# Patient Record
Sex: FEMALE | Race: WHITE | NOT HISPANIC OR LATINO | Employment: FULL TIME | URBAN - METROPOLITAN AREA
[De-identification: names, ages, dates, MRNs, and addresses within clinical notes are randomized per-mention and may not be internally consistent; named-entity substitution may affect disease eponyms.]

---

## 2017-08-14 ENCOUNTER — APPOINTMENT (OUTPATIENT)
Dept: URGENT CARE | Age: 47
End: 2017-08-14
Payer: OTHER MISCELLANEOUS

## 2017-08-14 PROCEDURE — G0382 LEV 3 HOSP TYPE B ED VISIT: HCPCS | Performed by: PREVENTIVE MEDICINE

## 2017-08-14 PROCEDURE — 99283 EMERGENCY DEPT VISIT LOW MDM: CPT | Performed by: PREVENTIVE MEDICINE

## 2017-08-16 ENCOUNTER — APPOINTMENT (OUTPATIENT)
Dept: URGENT CARE | Age: 47
End: 2017-08-16
Payer: OTHER MISCELLANEOUS

## 2017-08-16 PROCEDURE — 99214 OFFICE O/P EST MOD 30 MIN: CPT | Performed by: PREVENTIVE MEDICINE

## 2017-08-28 ENCOUNTER — APPOINTMENT (OUTPATIENT)
Dept: URGENT CARE | Age: 47
End: 2017-08-28
Payer: OTHER MISCELLANEOUS

## 2017-08-28 PROCEDURE — 99213 OFFICE O/P EST LOW 20 MIN: CPT | Performed by: PREVENTIVE MEDICINE

## 2017-11-07 ENCOUNTER — ALLSCRIPTS OFFICE VISIT (OUTPATIENT)
Dept: OTHER | Facility: OTHER | Age: 47
End: 2017-11-07

## 2017-11-07 DIAGNOSIS — N92.6 IRREGULAR MENSTRUATION: ICD-10-CM

## 2017-11-12 NOTE — PROGRESS NOTES
Assessment  1  Irregular menstrual cycle (626 4) (N92 6)   2  Depression (311) (F32 9)    Plan   Irregular menstrual cycle    · (1) CBC/PLT/DIFF; Status:Active; Requested [id number]:[id number];    Perform:[facility]; JGM:[id number]; Ordered; For:Irregular menstrual cycle; Ordered By:Crow, Randy Post;   · (1) [facility]; Status:Active; Requested JNK:[id number];    Perform:[facility]; NADER:[id number]; Ordered; For:Irregular menstrual cycle; Ordered By:Damon Maria;   · (1) HCG QUANT; Status:Active; Requested BBN:[id number];    Perform:[facility]; DXC:[id number]; Ordered; For:Irregular menstrual cycle; Ordered By:Odilon Mariai;   · (1) LH (LEUTINIZING HORMONE); Status:Active; Requested ITD:[id number];    Perform:[facility]; ZQW:[id number]; Ordered; For:Irregular menstrual cycle; Ordered By:Crow, Randy Post;   · (1) PROLACTIN; Status:Active; Requested [id number]:[id number];    Perform:[facility]; FYB:[id number]; Ordered; For:Irregular menstrual cycle; Ordered By:Odilon Mariai;   · (1) T4, FREE; Status:Active; Requested VIE:[id number];    Perform:[facility]; FXT:[id number]; Ordered; For:Irregular menstrual cycle; Ordered By:Odilon Mariai;   · (1) TSH; Status:Active; Requested [id number];    Perform:[facility]; HXJ:[id number]; Ordered;menstrual cycle; Ordered By:Crow, Randy Post;   · Follow-up PRN Evaluation and Treatment  Follow-up  Status: Complete  Done:2017   Ordered;Irregular menstrual cycle; Ordered By: Rohini Cervantes Performed:  Due: 26QFL8794  * US PELVIS COMPLETE (TRANSABDOMINAL AND TRANSVAGINAL); Status:Hold For - Scheduling; Requested WIF:[id number];  Perform:[facility]; ULD:[id number]; Ordered;    For:Irregular menstrual cycle; Ordered By:Damon Maria;    Discussion/Summary  Discussion Summary:   R/marilyn with patient suspect irregular menstrual cycle due to life stressors past couple months; R/marilyn perimenopause may be a factor as well - r/marilyn s/sx and what to expect; nevertheless, plan work-up to evaluate BW and pelvic u/s at this time;depression major issue and patient should not be embarrassed to seek medical help - highly encourage at least follow-up for discussion with a counselor and list given; Strongly encourage pt to consider follow-up with PCP or psychiatrist as well to review various different options for treatment; May find feels much better under treatment and may help combat other symptoms she reported as well; R/marilyn and encourage patient to keep alcohol at less than 2 drinks/day for women - needs to follow-up with PCP a well; Pt expressed understandingfor APE ASAP or sooner if needed  Counseling Documentation With Imm: The patient was counseled regarding diagnostic results,-- instructions for management,-- prognosis,-- risks and benefits of treatment options  Goals and Barriers: The patient has the current Goals: Evaluate missed periods  The patent has the current Barriers: None  Patient's Capacity to Self-Care: Patient is able to Self-Care  Medication SE Review and Pt Understands Tx: The treatment plan was reviewed with the patient/guardian  The patient/guardian understands and agrees with the treatment plan   Self Referrals:   Self Referrals: Yes      Chief Complaint  Chief Complaint Free Text Note Form: 41 yr old NP problem no periods      History of Present Illness  HPI: Pt presents to the office today complaining of irregular menstrual cycle; Last documented real LMP 8/12/17 - periods used to be reg q month, last 2-3 days; No germania/metrohagia/dysmenorrhea; Pt father had first HA in Sept and then passed about a month later - has been under a lot of stress/depression as a results these past couple months; Pt did experience pelvic cramping throughout these past couple months with no bleeding; Pt started spotting last Friday and started with extremely heavy flow and cramping Saturday evening - flow has been waxing and waning since Sunday;  Had another episode of intense cramping yesterday evening for which she took ibuprofen and heating pad with positive effect; Denies hot flashes/night sweats; Does report low libido/sexual sensation over the past couple years; Pt in a mutually monog sexual relationship x 20+ years- declines std/hiv/hep testing; no form of BC that they use; Pt has a past history of severe depression - was hospitalized as a teenager; Has not seen a provider in years to address depression because embarrassed to report to provider- has been an issue over these past couple years and also increased symptoms these past couple months with her father's condition; Pt has been resorting to alcohol - reports 10 drinks/week but thinks may have under-reported; Did lose about 10 lbs when dealing with her father in Sept/Oct  but has gained all the weight back this past week prior to period; Review of Systems   Female ROS: dysmenorrhea-- and-- periods are irregular, but-- no pelvic pain,-- no pelvic pressure,-- no vaginal pain,-- no vaginal discharge,-- no vaginal itching,-- no vaginal lump or mass,-- no vaginal odor,-- no nonmenstrual bleeding,-- no vulvar pain,-- no vulvar itching,-- no vulvar lump or mass,-- no labial swelling,-- denied amenorrhea,-- no menorrhagia,-- no hypomenorrhea,-- no oligomenorrhea,-- no decreased time between periods-- and-- regular length of periods  Focused-Female:  Constitutional: No fever, no chills, feels well, no tiredness, no recent weight gain or loss  Gastrointestinal: no complaints of abdominal pain, no constipation, no nausea or diarrhea, no vomiting, no bloody stools  Genitourinary: dysmenorrhea, but-- no dysuria,-- no pelvic pain-- and-- no incontinence  Neurological: headache  Past Medical History  1  History of pregnancy (V13 29)    Surgical History  1  History of Oral Surgery Tooth Extraction [place] [facility]    Family History  Father    1  Family history of diabetes mellitus (V18 0) (Z83 3)   2   Family history of hypertension (V17 49) (Z82 49)  Brother    3  Family history of diabetes mellitus (V18 0) (Z83 3)   4  Family history of hypertension (V17 49) (Z82 49)    Social History   · Former smoker (L05 41) (Q83 605)   · Denied: History of H/O domestic violence   · Denied: History of drug use   · Occasional alcohol use    Current Meds   1  No Reported Medications Recorded    Allergies  1  No Known Drug Allergies    Vitals  Vital Signs    Recorded: 83OSI5060 81:21FP   Systolic 889, LUE, Sitting   Diastolic 72, LUE, Sitting   Height 5 ft 3 in   Weight 130 lb    BMI Calculated 23 03   BSA Calculated 1 61       Physical Exam  vitals r/marilyn  Constitutional  General appearance: No acute distress, well appearing and well nourished  Genitourinary  External genitalia: Normal and no lesions appreciated  Vagina: Normal, no lesions or dryness appreciated  -- small amount of dark red blood in vaginal canal from cervical os  Urethra: Normal    Urethral meatus: Normal    Bladder: Normal, soft, non-tender and no prolapse or masses appreciated  Cervix: Normal, no palpable masses  Uterus: Normal, non-tender, not enlarged, and no palpable masses  Adnexa/parametria: Normal, non-tender and no fullness or masses appreciated  Abdomen  Abdomen: Normal, non-tender, and no organomegaly noted  Liver and spleen: No hepatomegaly or splenomegaly  Psychiatric  Mood and affect: Abnormal  -- Flat affect  Attending Note  Collaborating Physician Note: Collaborating Physician: I discussed the case with the Advanced Practitioner and reviewed the note,-- I supervised the Advanced Practitioner-- and-- I agree with the Advanced Practitioner note  Future Appointments    Date/Time Provider Specialty Site   12/29/2017 03:30 PM Yanci Greer [facility] Obstetrics/Gynecology [address] OBGYN       Signatures   Electronically signed by :  Reuben Huerta [facility]; Nov 7 2017  3:07PM EST                       (Author)    Electronically signed by : Marco Segovia STEPH Cerrato ; Nov 11 2017 11:22PM EST                       (Author)

## 2017-11-16 ENCOUNTER — LAB CONVERSION - ENCOUNTER (OUTPATIENT)
Dept: OTHER | Facility: OTHER | Age: 47
End: 2017-11-16

## 2017-11-16 LAB
BASOPHILS # BLD AUTO: 1.6 %
BASOPHILS # BLD AUTO: 102 CELLS/UL (ref 0–200)
DEPRECATED RDW RBC AUTO: 13.2 % (ref 11–15)
EOSINOPHIL # BLD AUTO: 230 CELLS/UL (ref 15–500)
EOSINOPHIL # BLD AUTO: 3.6 %
FSH (HISTORICAL): 17.8 MIU/ML
HCG QUANTITATIVE (HISTORICAL): <2 MIU/ML
HCT VFR BLD AUTO: 38.2 % (ref 38.5–45)
HGB BLD-MCNC: 12.2 G/DL (ref 13.2–15.5)
LUTEINIZING HORMONE (HISTORICAL): 13.8 MIU/ML
LYMPHOCYTES # BLD AUTO: 1939 CELLS/UL (ref 850–3900)
LYMPHOCYTES # BLD AUTO: 30.3 %
MCH RBC QN AUTO: 29.3 PG (ref 27–33)
MCHC RBC AUTO-ENTMCNC: 31.9 G/DL (ref 32–36)
MCV RBC AUTO: 91.6 FL (ref 80–100)
MONOCYTES # BLD AUTO: 480 CELLS/UL (ref 200–950)
MONOCYTES (HISTORICAL): 7.5 %
NEUTROPHILS # BLD AUTO: 3648 CELLS/UL (ref 1500–7800)
NEUTROPHILS # BLD AUTO: 57 %
PLATELET # BLD AUTO: 324 THOUSAND/UL (ref 140–400)
PMV BLD AUTO: 11.7 FL (ref 7.5–12.5)
PROLACTIN (HISTORICAL): 8 NG/ML
RBC # BLD AUTO: 4.17 MILLION/UL (ref 4.2–5.1)
T4 FREE SERPL-MCNC: 0.9 NG/DL (ref 0.8–1.8)
TSH SERPL DL<=0.05 MIU/L-ACNC: 1 MIU/L (ref 0.4–4.5)
WBC # BLD AUTO: 6.4 THOUSAND/UL (ref 3.8–10.8)

## 2017-12-13 ENCOUNTER — GENERIC CONVERSION - ENCOUNTER (OUTPATIENT)
Dept: OTHER | Facility: OTHER | Age: 47
End: 2017-12-13

## 2018-01-12 VITALS
BODY MASS INDEX: 23.04 KG/M2 | DIASTOLIC BLOOD PRESSURE: 72 MMHG | WEIGHT: 130 LBS | HEIGHT: 63 IN | SYSTOLIC BLOOD PRESSURE: 108 MMHG

## 2018-03-05 ENCOUNTER — TELEPHONE (OUTPATIENT)
Dept: OBGYN CLINIC | Facility: CLINIC | Age: 48
End: 2018-03-05

## 2018-03-07 PROBLEM — N92.6 IRREGULAR MENSTRUAL CYCLE: Status: ACTIVE | Noted: 2017-11-07

## 2018-03-07 PROBLEM — N64.4 BREAST PAIN: Status: ACTIVE | Noted: 2018-03-07

## 2018-03-07 PROBLEM — F32.A DEPRESSION: Status: ACTIVE | Noted: 2017-11-07

## 2018-03-09 ENCOUNTER — OFFICE VISIT (OUTPATIENT)
Dept: SURGICAL ONCOLOGY | Facility: CLINIC | Age: 48
End: 2018-03-09
Payer: COMMERCIAL

## 2018-03-09 ENCOUNTER — HOSPITAL ENCOUNTER (OUTPATIENT)
Dept: RADIOLOGY | Facility: HOSPITAL | Age: 48
Discharge: HOME | End: 2018-03-09
Attending: RADIOLOGY

## 2018-03-09 VITALS
WEIGHT: 137 LBS | HEIGHT: 63 IN | SYSTOLIC BLOOD PRESSURE: 112 MMHG | TEMPERATURE: 98.8 F | RESPIRATION RATE: 16 BRPM | HEART RATE: 68 BPM | BODY MASS INDEX: 24.27 KG/M2 | DIASTOLIC BLOOD PRESSURE: 70 MMHG

## 2018-03-09 DIAGNOSIS — Z76.89 REFERRAL OF PATIENT WITHOUT EXAMINATION OR TREATMENT: ICD-10-CM

## 2018-03-09 DIAGNOSIS — R92.2 DENSE BREAST TISSUE ON MAMMOGRAM: ICD-10-CM

## 2018-03-09 DIAGNOSIS — N64.4 BREAST PAIN: Primary | ICD-10-CM

## 2018-03-09 DIAGNOSIS — Z12.31 SCREENING MAMMOGRAM, ENCOUNTER FOR: ICD-10-CM

## 2018-03-09 PROBLEM — R92.30 DENSE BREAST TISSUE ON MAMMOGRAM: Status: ACTIVE | Noted: 2018-03-09

## 2018-03-09 PROCEDURE — 99243 OFF/OP CNSLTJ NEW/EST LOW 30: CPT | Performed by: SURGERY

## 2018-03-09 NOTE — LETTER
March 9, 2018     Damaris Cervantes MD  [address]    Patient: Erin Jovel   YOB: 1970   Date of Visit: 3/9/2018       Dear Dr Major Litten: Thank you for referring Erin Jovel to me for evaluation  Below are my notes for this consultation  If you have questions, please do not hesitate to call me  I look forward to following your patient along with you  Sincerely,        Lynne Wu MD        CC: No Recipients  Lynne Wu MD  3/9/2018  9:19 AM  Sign at close encounter               Surgical Oncology Follow Up       [address] [place] [address]  1970  [id number]  [address]  [facility] [place] [address]      Chief Complaint:     Chief Complaint   Patient presents with    Breast Pain       Assessment and Plan:   Assessment/Plan   Intermittent mastodynia, bilateral    Screening 3D diagnostic mammogram, vitamin-E, Premarin as well, three-month follow-up visit    Oncology History:      No history exists  History of Present Illness: This is a 61-year-old woman who was told she had fibrocystic disease starting at the age of 14 when she had relatively significant bilateral breast pain which would cause her to have tears  This became a intermittent problem for her  She restricted her caffeine use and has not been significantly problematic until more recently  The patient had a screening mammogram in 2016 which showed no worrisome findings  She does have dense breast   Patient presents now for an opinion regarding further management  Review of Systems:   Review of Systems   Constitutional: Negative for activity change, appetite change, fatigue and unexpected weight change  HENT: Negative for ear pain, tinnitus, trouble swallowing and voice change      Eyes: Negative for pain and visual disturbance  Respiratory: Negative for cough, shortness of breath, wheezing and stridor  Cardiovascular: Negative for chest pain, palpitations and leg swelling  Gastrointestinal: Negative for abdominal distention, abdominal pain and blood in stool  Endocrine: Negative for cold intolerance and heat intolerance  Genitourinary: Negative for difficulty urinating, dysuria, flank pain and hematuria  Musculoskeletal: Negative for arthralgias, back pain, gait problem and joint swelling  Skin: Negative for color change, rash and wound  Allergic/Immunologic: Negative for immunocompromised state  Neurological: Negative for dizziness, seizures, speech difficulty, weakness and headaches  Hematological: Negative for adenopathy  Psychiatric/Behavioral: Negative for confusion  Past Medical History:      Patient Active Problem List   Diagnosis    Depression    Irregular menstrual cycle    Breast pain    Dense breast tissue on mammogram      History reviewed  No pertinent past medical history  History reviewed  No pertinent surgical history  Family History   Problem Relation Age of Onset    Lung cancer Maternal Uncle     Uterine cancer Maternal Grandmother     Lymphoma Paternal Grandmother         Social History     Social History    Marital status: Single     Spouse name: N/A    Number of children: N/A    Years of education: N/A     Occupational History    Not on file  Social History Main Topics    Smoking status: Former Smoker    Smokeless tobacco: Never Used    Alcohol use Yes      Comment: 4 glasses a week    Drug use: No    Sexual activity: Not on file     Other Topics Concern    Not on file     Social History Narrative    No narrative on file      No current outpatient prescriptions on file     Allergies not on file    Physical Exam:     Vitals:    03/09/18 0832   BP: 112/70   Pulse: 68   Resp: 16   Temp: 98 8 °F (37 1 °C)     Physical Exam Pulmonary/Chest:     Both breasts were examined in the sitting and supine position  There are no worrisome skin lesions, no nipple retraction and no nipple discharge  There are no dominant masses, axillary adenopathy or supraclavicular adenopathy on either side  Results:   Pathology:  Not applicable  Imaging  I reviewed her imaging from 2016 I concur with report  Discussion/Summary:   The patient does not have significant family history for breast cancer  She has no sign or symptom of breast cancer  She has a normal clinical breast exam   I recommended she have a screening 3D mammogram because of her dense breast   I reviewed vitamin-E and Premarin as well with her provided her written instructions  I explained that approximately 25% of patients will be benefit from this medication  We will see her back in 3 months unless she has abnormal imaging  All questions were answered to the patient's satisfaction  Advance Care Planning/Advance Directives:  I discussed the disease status, treatment plans and follow-up with the patient

## 2018-03-09 NOTE — PROGRESS NOTES
Surgical Oncology Follow Up       [address] [place] [address],[address] [facility]  [facility] [place] [address] [address] [facility]  1970  [id number]  [address] [facility] [facility]  [facility] [place] [address] [address] [id number]      Chief Complaint:     Chief Complaint   Patient presents with    Breast Pain       Assessment and Plan:   Assessment/Plan   Intermittent mastodynia, bilateral    Screening 3D diagnostic mammogram, vitamin-E, Premarin as well, three-month follow-up visit    Oncology History:      No history exists  History of Present Illness: This is a 70-year-old woman who was told she had fibrocystic disease starting at the age of 14 when she had relatively significant bilateral breast pain which would cause her to have tears  This became a intermittent problem for her  She restricted her caffeine use and has not been significantly problematic until more recently  The patient had a screening mammogram in 2016 which showed no worrisome findings  She does have dense breast   Patient presents now for an opinion regarding further management  Review of Systems:   Review of Systems   Constitutional: Negative for activity change, appetite change, fatigue and unexpected weight change  HENT: Negative for ear pain, tinnitus, trouble swallowing and voice change  Eyes: Negative for pain and visual disturbance  Respiratory: Negative for cough, shortness of breath, wheezing and stridor  Cardiovascular: Negative for chest pain, palpitations and leg swelling  Gastrointestinal: Negative for abdominal distention, abdominal pain and blood in stool  Endocrine: Negative for cold intolerance and heat intolerance  Genitourinary: Negative for difficulty urinating, dysuria, flank pain and hematuria  Musculoskeletal: Negative for arthralgias, back pain, gait problem and joint swelling     Skin: Negative for color change, rash and wound  Allergic/Immunologic: Negative for immunocompromised state  Neurological: Negative for dizziness, seizures, speech difficulty, weakness and headaches  Hematological: Negative for adenopathy  Psychiatric/Behavioral: Negative for confusion  Past Medical History:      Patient Active Problem List   Diagnosis    Depression    Irregular menstrual cycle    Breast pain    Dense breast tissue on mammogram      History reviewed  No pertinent past medical history  History reviewed  No pertinent surgical history  Family History   Problem Relation Age of Onset    Lung cancer Maternal Uncle     Uterine cancer Maternal Grandmother     Lymphoma Paternal Grandmother         Social History     Social History    Marital status: Single     Spouse name: N/A    Number of children: N/A    Years of education: N/A     Occupational History    Not on file  Social History Main Topics    Smoking status: Former Smoker    Smokeless tobacco: Never Used    Alcohol use Yes      Comment: 4 glasses a week    Drug use: No    Sexual activity: Not on file     Other Topics Concern    Not on file     Social History Narrative    No narrative on file      No current outpatient prescriptions on file  Allergies not on file    Physical Exam:     Vitals:    03/09/18 0832   BP: 112/70   Pulse: 68   Resp: 16   Temp: 98 8 °F (37 1 °C)     Physical Exam   Pulmonary/Chest:     Both breasts were examined in the sitting and supine position  There are no worrisome skin lesions, no nipple retraction and no nipple discharge  There are no dominant masses, axillary adenopathy or supraclavicular adenopathy on either side  Results:   Pathology:  Not applicable  Imaging  I reviewed her imaging from 2016 I concur with report  Discussion/Summary:   The patient does not have significant family history for breast cancer  She has no sign or symptom of breast cancer    She has a normal clinical breast exam   I recommended she have a screening 3D mammogram because of her dense breast   I reviewed vitamin-E and Premarin as well with her provided her written instructions  I explained that approximately 25% of patients will be benefit from this medication  We will see her back in 3 months unless she has abnormal imaging  All questions were answered to the patient's satisfaction  Advance Care Planning/Advance Directives:  I discussed the disease status, treatment plans and follow-up with the patient

## 2018-04-06 ENCOUNTER — HOSPITAL ENCOUNTER (OUTPATIENT)
Dept: MAMMOGRAPHY | Facility: CLINIC | Age: 48
Discharge: HOME | End: 2018-04-06
Payer: COMMERCIAL

## 2018-04-06 PROCEDURE — 77066 DX MAMMO INCL CAD BI: CPT

## 2018-04-06 PROCEDURE — G0279 TOMOSYNTHESIS, MAMMO: HCPCS

## 2018-06-11 ENCOUNTER — TELEPHONE (OUTPATIENT)
Dept: SURGICAL ONCOLOGY | Facility: CLINIC | Age: 48
End: 2018-06-11

## 2018-09-17 ENCOUNTER — APPOINTMENT (OUTPATIENT)
Dept: URGENT CARE | Age: 48
End: 2018-09-17
Payer: OTHER MISCELLANEOUS

## 2018-09-17 ENCOUNTER — APPOINTMENT (OUTPATIENT)
Dept: RADIOLOGY | Age: 48
End: 2018-09-17
Payer: OTHER MISCELLANEOUS

## 2018-09-17 DIAGNOSIS — S69.91XA INJURY OF RIGHT WRIST, INITIAL ENCOUNTER: ICD-10-CM

## 2018-09-17 DIAGNOSIS — S69.91XA INJURY OF RIGHT WRIST, INITIAL ENCOUNTER: Primary | ICD-10-CM

## 2018-09-17 PROCEDURE — 73110 X-RAY EXAM OF WRIST: CPT

## 2018-09-17 PROCEDURE — 99284 EMERGENCY DEPT VISIT MOD MDM: CPT | Performed by: PREVENTIVE MEDICINE

## 2018-09-17 PROCEDURE — G0383 LEV 4 HOSP TYPE B ED VISIT: HCPCS | Performed by: PREVENTIVE MEDICINE

## 2018-09-20 ENCOUNTER — APPOINTMENT (OUTPATIENT)
Dept: URGENT CARE | Age: 48
End: 2018-09-20
Payer: OTHER MISCELLANEOUS

## 2018-09-20 PROCEDURE — 99213 OFFICE O/P EST LOW 20 MIN: CPT | Performed by: PREVENTIVE MEDICINE

## 2019-10-31 ENCOUNTER — ANNUAL EXAM (OUTPATIENT)
Dept: OBGYN CLINIC | Facility: CLINIC | Age: 49
End: 2019-10-31
Payer: COMMERCIAL

## 2019-10-31 VITALS
HEIGHT: 62 IN | BODY MASS INDEX: 25.03 KG/M2 | SYSTOLIC BLOOD PRESSURE: 120 MMHG | WEIGHT: 136 LBS | DIASTOLIC BLOOD PRESSURE: 80 MMHG

## 2019-10-31 DIAGNOSIS — Z01.419 ROUTINE GYNECOLOGICAL EXAMINATION: Primary | ICD-10-CM

## 2019-10-31 DIAGNOSIS — N92.1 MENORRHAGIA WITH IRREGULAR CYCLE: ICD-10-CM

## 2019-10-31 DIAGNOSIS — Z12.31 ENCOUNTER FOR SCREENING MAMMOGRAM FOR MALIGNANT NEOPLASM OF BREAST: ICD-10-CM

## 2019-10-31 DIAGNOSIS — Z13.220 SCREENING FOR LIPID DISORDERS: ICD-10-CM

## 2019-10-31 DIAGNOSIS — N92.6 IRREGULAR MENSES: ICD-10-CM

## 2019-10-31 PROCEDURE — 87624 HPV HI-RISK TYP POOLED RSLT: CPT | Performed by: PHYSICIAN ASSISTANT

## 2019-10-31 PROCEDURE — 99396 PREV VISIT EST AGE 40-64: CPT | Performed by: PHYSICIAN ASSISTANT

## 2019-10-31 PROCEDURE — G0145 SCR C/V CYTO,THINLAYER,RESCR: HCPCS | Performed by: PHYSICIAN ASSISTANT

## 2019-10-31 RX ORDER — OMEPRAZOLE 40 MG/1
40 CAPSULE, DELAYED RELEASE ORAL DAILY
Refills: 2 | COMMUNITY
Start: 2019-08-02 | End: 2020-06-11

## 2019-10-31 RX ORDER — METHOCARBAMOL 500 MG/1
TABLET, FILM COATED ORAL
Refills: 0 | COMMUNITY
Start: 2019-10-22 | End: 2020-06-11

## 2019-10-31 RX ORDER — CYCLOBENZAPRINE HCL 10 MG
10 TABLET ORAL 3 TIMES DAILY PRN
Refills: 0 | COMMUNITY
Start: 2019-09-16 | End: 2020-06-11

## 2019-10-31 RX ORDER — ACETAMINOPHEN AND CODEINE PHOSPHATE 120; 12 MG/5ML; MG/5ML
1 SOLUTION ORAL DAILY
Qty: 30 TABLET | Refills: 2 | Status: SHIPPED | OUTPATIENT
Start: 2019-10-31 | End: 2020-02-12

## 2019-10-31 RX ORDER — MELOXICAM 7.5 MG/1
7.5 TABLET ORAL DAILY
Refills: 0 | COMMUNITY
Start: 2019-07-24 | End: 2020-06-11

## 2019-10-31 NOTE — PROGRESS NOTES
Assessment/Plan   Problem List Items Addressed This Visit        Other    Routine gynecological examination - Primary     Pap guidelines reviewed  Pap with HPV done today  Reviewed irregular, heavy, painful menses  Likely starting perimenopause, script for TFTs and CBC given to further evaluate  Reviewed options with patient including Ibuprofen 600mg Q6hrs, OCP, Mirena IUD  Patient would like to consider OCP, would like estrogen free secondary to age  Script for MELECIO Urban given  Reviewed when to start, what to do if misses pill  Recommend using condoms for the 1st month on the pill, if misses more than 2 pills in the pack, if on antibiotics and for STD prevention  Reviewed common side effects of the pill including nausea, vomiting, breast pain, bloating, fatigue, mood swings, weight gain, and increased acne  Reassured side effects typically diminish in the first month or two on the pill  Will return to office in 3 months for pill check  Or call if doing ok  Script for routine blood work given  Relevant Orders    Liquid-based pap, screening (Completed)    HPV High Risk (Completed)      Other Visit Diagnoses     Encounter for screening mammogram for malignant neoplasm of breast        Relevant Orders    Mammo screening bilateral w 3d & cad    Irregular menses        Relevant Medications    norethindrone (MICRONOR) 0 35 MG tablet    Other Relevant Orders    Comprehensive metabolic panel    T4, free    TSH, 3rd generation    Menorrhagia with irregular cycle        Relevant Medications    norethindrone (MICRONOR) 0 35 MG tablet    Other Relevant Orders    CBC and differential    Comprehensive metabolic panel    Screening for lipid disorders        Relevant Orders    Lipid panel          Subjective:     Patient ID: Alvaro Scott is a 54 y o  y o  female  HPI  55 yo seen for annual exam  Menses irregular, every 1-2 months apart  Last about 3 days, heavy and painful  Denies bowel or bladder issues  Has not had routine blood work done in quite a while  Last pap: unknown? Last mammogram: 2018 [facility] 1: Negative  The following portions of the patient's history were reviewed and updated as appropriate:   She  has a past medical history of Arthritis and Depression  She   Patient Active Problem List    Diagnosis Date Noted    Routine gynecological examination 10/31/2019    Dense breast tissue on mammogram 2018    Screening mammogram, encounter for 2018    Breast pain 2018    Depression 2017    Irregular menstrual cycle 2017     She  has a past surgical history that includes [place] tooth extraction  Her family history includes Diabetes in her brother and father; Hypertension in her brother, father, and mother; Lung cancer in her maternal uncle; Lymphoma in her paternal grandmother; Ovarian cancer in her maternal grandmother; Uterine cancer in her maternal grandmother  She  reports that she has quit smoking  She has never used smokeless tobacco  She reports that she drinks alcohol  She reports that she does not use drugs  Current Outpatient Medications   Medication Sig Dispense Refill    cyclobenzaprine (FLEXERIL) 10 mg tablet Take 10 mg by mouth 3 (three) times a day as needed  0    diclofenac sodium (VOLTAREN) 50 mg EC tablet Take 50 mg by mouth 2 (two) times a day as needed  0    meloxicam (MOBIC) 7 5 mg tablet Take 7 5 mg by mouth daily  0    methocarbamol (ROBAXIN) 500 mg tablet TAKE 2 TABLETS BY MOUTH 4 TIMES A DAY AS NEEDED FOR MUSCLE SPASM  0    norethindrone (MICRONOR) 0 35 MG tablet Take 1 tablet (0 35 mg total) by mouth daily 30 tablet 2    omeprazole (PriLOSEC) 40 MG capsule Take 40 mg by mouth daily  2     No current facility-administered medications for this visit  She has No Known Allergies       Menstrual History:  OB History        2    Para   2    Term   2            AB        Living   2       SAB        TAB        Ectopic Multiple        Live Births   2                Menarche age: 7  Patient's last menstrual period was 10/02/2019  Period Cycle (Days): (28-60)  Period Duration (Days): 3  Period Pattern: (!) Irregular  Menstrual Flow: Moderate, Heavy  Dysmenorrhea: (!) Moderate  Dysmenorrhea Symptoms: Cramping      Review of Systems   Constitutional: Negative for fatigue, fever and unexpected weight change  HENT: Negative for dental problem and sinus pressure  Eyes: Negative for visual disturbance  Respiratory: Negative for cough, shortness of breath and wheezing  Cardiovascular: Negative for chest pain  Gastrointestinal: Negative for abdominal pain, blood in stool, constipation, diarrhea, nausea and vomiting  Endocrine: Negative for polydipsia  Genitourinary: Positive for menstrual problem and pelvic pain  Negative for difficulty urinating, dyspareunia, dysuria, frequency, hematuria and urgency  Musculoskeletal: Negative for arthralgias and back pain  Neurological: Negative for dizziness, seizures, light-headedness and headaches  Psychiatric/Behavioral: Negative for suicidal ideas  The patient is not nervous/anxious  Objective:  Vitals:    10/31/19 0925   BP: 120/80   BP Location: Left arm   Patient Position: Sitting   Cuff Size: Standard   Weight: 61 7 kg (136 lb)   Height: 5' 2" (1 575 m)      Physical Exam   Constitutional: She is oriented to person, place, and time  She appears well-developed and well-nourished  Genitourinary: Rectum normal, vagina normal and uterus normal  There is no rash, tenderness, lesion, injury or Bartholin's cyst on the right labia  There is no rash, tenderness, lesion, injury or Bartholin's cyst on the left labia  Vagina exhibits no lesion  No erythema, tenderness or bleeding in the vagina  No signs of injury around the vagina  No vaginal discharge found  Right adnexum does not display mass, does not display tenderness and does not display fullness   Left adnexum does not display mass, does not display tenderness and does not display fullness  Cervix does not exhibit motion tenderness, lesion or discharge  Uterus is not enlarged, tender, exhibiting a mass, irregular (is regular) or mobile  Rectal exam shows no external hemorrhoid, no internal hemorrhoid, no fissure, no mass, no tenderness, anal tone normal and guaiac negative stool  HENT:   Head: Normocephalic and atraumatic  Neck: No thyromegaly present  Cardiovascular: Normal rate, regular rhythm and normal heart sounds  Exam reveals no gallop and no friction rub  No murmur heard  Pulmonary/Chest: Effort normal and breath sounds normal  No respiratory distress  She has no wheezes  Right breast exhibits no inverted nipple, no mass, no nipple discharge, no skin change and no tenderness  Left breast exhibits no inverted nipple, no mass, no nipple discharge, no skin change and no tenderness  No breast swelling, tenderness, discharge or bleeding  Breasts are symmetrical    Abdominal: Soft  She exhibits no distension and no mass  There is no tenderness  There is no rebound and no guarding  No hernia  Lymphadenopathy:     She has no cervical adenopathy  Right: No inguinal adenopathy present  Left: No inguinal adenopathy present  Neurological: She is alert and oriented to person, place, and time  Skin: Skin is warm and dry  Psychiatric: She has a normal mood and affect   Her behavior is normal

## 2019-11-01 LAB
HPV HR 12 DNA CVX QL NAA+PROBE: NEGATIVE
HPV16 DNA CVX QL NAA+PROBE: NEGATIVE
HPV18 DNA CVX QL NAA+PROBE: NEGATIVE

## 2019-11-05 LAB
LAB AP GYN PRIMARY INTERPRETATION: NORMAL
LAB AP LMP: NORMAL
Lab: NORMAL

## 2019-11-19 NOTE — ASSESSMENT & PLAN NOTE
Pap guidelines reviewed  Pap with HPV done today  Reviewed irregular, heavy, painful menses  Likely starting perimenopause, script for TFTs and CBC given to further evaluate  Reviewed options with patient including Ibuprofen 600mg Q6hrs, OCP, Mirena IUD  Patient would like to consider OCP, would like estrogen free secondary to age  Script for MELECIO Urban given  Reviewed when to start, what to do if misses pill  Recommend using condoms for the 1st month on the pill, if misses more than 2 pills in the pack, if on antibiotics and for STD prevention  Reviewed common side effects of the pill including nausea, vomiting, breast pain, bloating, fatigue, mood swings, weight gain, and increased acne  Reassured side effects typically diminish in the first month or two on the pill  Will return to office in 3 months for pill check  Or call if doing ok  Script for routine blood work given

## 2020-02-02 DIAGNOSIS — N92.1 MENORRHAGIA WITH IRREGULAR CYCLE: ICD-10-CM

## 2020-02-02 DIAGNOSIS — N92.6 IRREGULAR MENSES: ICD-10-CM

## 2020-02-12 RX ORDER — ACETAMINOPHEN AND CODEINE PHOSPHATE 120; 12 MG/5ML; MG/5ML
SOLUTION ORAL
Qty: 28 TABLET | Refills: 0 | Status: SHIPPED | OUTPATIENT
Start: 2020-02-12 | End: 2020-04-22

## 2020-04-20 DIAGNOSIS — N92.1 MENORRHAGIA WITH IRREGULAR CYCLE: ICD-10-CM

## 2020-04-20 DIAGNOSIS — N92.6 IRREGULAR MENSES: ICD-10-CM

## 2020-04-22 RX ORDER — ACETAMINOPHEN AND CODEINE PHOSPHATE 120; 12 MG/5ML; MG/5ML
SOLUTION ORAL
Qty: 28 TABLET | Refills: 2 | Status: SHIPPED | OUTPATIENT
Start: 2020-04-22 | End: 2020-06-11

## 2020-05-29 ENCOUNTER — TELEPHONE (OUTPATIENT)
Dept: OBGYN CLINIC | Facility: CLINIC | Age: 50
End: 2020-05-29

## 2020-06-11 ENCOUNTER — OFFICE VISIT (OUTPATIENT)
Dept: OBGYN CLINIC | Facility: CLINIC | Age: 50
End: 2020-06-11
Payer: COMMERCIAL

## 2020-06-11 VITALS
SYSTOLIC BLOOD PRESSURE: 118 MMHG | BODY MASS INDEX: 27.05 KG/M2 | WEIGHT: 147 LBS | HEIGHT: 62 IN | DIASTOLIC BLOOD PRESSURE: 82 MMHG

## 2020-06-11 DIAGNOSIS — R23.2 HOT FLASHES: Primary | ICD-10-CM

## 2020-06-11 DIAGNOSIS — F41.9 ANXIETY: ICD-10-CM

## 2020-06-11 PROCEDURE — 99214 OFFICE O/P EST MOD 30 MIN: CPT | Performed by: PHYSICIAN ASSISTANT

## 2020-06-11 RX ORDER — MELOXICAM 15 MG/1
15 TABLET ORAL DAILY
COMMUNITY
Start: 2020-04-30

## 2020-06-11 RX ORDER — OMEPRAZOLE 20 MG/1
20 CAPSULE, DELAYED RELEASE ORAL
COMMUNITY
Start: 2020-03-27

## 2020-06-11 RX ORDER — PAROXETINE 10 MG/1
10 TABLET, FILM COATED ORAL DAILY
Qty: 30 TABLET | Refills: 1 | Status: SHIPPED | OUTPATIENT
Start: 2020-06-11 | End: 2020-08-28

## 2020-08-27 DIAGNOSIS — F41.9 ANXIETY: ICD-10-CM

## 2020-08-27 DIAGNOSIS — R23.2 HOT FLASHES: ICD-10-CM

## 2020-08-28 RX ORDER — PAROXETINE 10 MG/1
TABLET, FILM COATED ORAL
Qty: 30 TABLET | Refills: 1 | Status: SHIPPED | OUTPATIENT
Start: 2020-08-28

## 2021-04-27 ENCOUNTER — IMMUNIZATIONS (OUTPATIENT)
Dept: FAMILY MEDICINE CLINIC | Facility: HOSPITAL | Age: 51
End: 2021-04-27

## 2021-04-27 DIAGNOSIS — Z23 ENCOUNTER FOR IMMUNIZATION: Primary | ICD-10-CM

## 2021-04-27 PROCEDURE — 0011A SARS-COV-2 / COVID-19 MRNA VACCINE (MODERNA) 100 MCG: CPT

## 2021-04-27 PROCEDURE — 91301 SARS-COV-2 / COVID-19 MRNA VACCINE (MODERNA) 100 MCG: CPT

## 2021-05-25 ENCOUNTER — IMMUNIZATIONS (OUTPATIENT)
Dept: FAMILY MEDICINE CLINIC | Facility: HOSPITAL | Age: 51
End: 2021-05-25

## 2021-05-25 DIAGNOSIS — Z23 ENCOUNTER FOR IMMUNIZATION: Primary | ICD-10-CM

## 2021-05-25 PROCEDURE — 0012A SARS-COV-2 / COVID-19 MRNA VACCINE (MODERNA) 100 MCG: CPT

## 2021-05-25 PROCEDURE — 91301 SARS-COV-2 / COVID-19 MRNA VACCINE (MODERNA) 100 MCG: CPT

## 2021-12-03 ENCOUNTER — OCCMED (OUTPATIENT)
Dept: URGENT CARE | Facility: CLINIC | Age: 51
End: 2021-12-03

## 2021-12-03 ENCOUNTER — APPOINTMENT (OUTPATIENT)
Dept: RADIOLOGY | Facility: CLINIC | Age: 51
End: 2021-12-03
Payer: COMMERCIAL

## 2021-12-03 DIAGNOSIS — S49.92XA INJURY OF LEFT UPPER ARM, INITIAL ENCOUNTER: ICD-10-CM

## 2021-12-03 DIAGNOSIS — S49.92XA INJURY OF LEFT UPPER ARM, INITIAL ENCOUNTER: Primary | ICD-10-CM

## 2021-12-03 PROCEDURE — 73080 X-RAY EXAM OF ELBOW: CPT

## 2021-12-03 PROCEDURE — 73110 X-RAY EXAM OF WRIST: CPT

## 2021-12-05 ENCOUNTER — APPOINTMENT (OUTPATIENT)
Dept: URGENT CARE | Facility: CLINIC | Age: 51
End: 2021-12-05
Payer: OTHER MISCELLANEOUS

## 2021-12-05 PROCEDURE — 99213 OFFICE O/P EST LOW 20 MIN: CPT | Performed by: PHYSICIAN ASSISTANT

## 2021-12-30 ENCOUNTER — HOSPITAL ENCOUNTER (EMERGENCY)
Facility: HOSPITAL | Age: 51
Discharge: HOME | End: 2021-12-30
Payer: COMMERCIAL

## 2021-12-30 ENCOUNTER — APPOINTMENT (EMERGENCY)
Dept: RADIOLOGY | Facility: HOSPITAL | Age: 51
End: 2021-12-30
Payer: COMMERCIAL

## 2021-12-30 VITALS
DIASTOLIC BLOOD PRESSURE: 70 MMHG | TEMPERATURE: 98.5 F | HEART RATE: 60 BPM | RESPIRATION RATE: 17 BRPM | SYSTOLIC BLOOD PRESSURE: 115 MMHG | OXYGEN SATURATION: 100 %

## 2021-12-30 DIAGNOSIS — R06.00 DYSPNEA, UNSPECIFIED TYPE: Primary | ICD-10-CM

## 2021-12-30 LAB
ALBUMIN SERPL BCP-MCNC: 4.5 G/DL (ref 3.4–4.8)
ALP SERPL-CCNC: 45.5 U/L (ref 35–140)
ALT SERPL W P-5'-P-CCNC: 11 U/L (ref 5–54)
ANION GAP SERPL CALCULATED.3IONS-SCNC: 6 MMOL/L (ref 4–13)
AST SERPL W P-5'-P-CCNC: 15 U/L (ref 15–41)
BASOPHILS # BLD MANUAL: 0 THOUSAND/UL (ref 0–0.1)
BASOPHILS NFR MAR MANUAL: 0 % (ref 0–1)
BILIRUB SERPL-MCNC: 0.57 MG/DL (ref 0.3–1.2)
BUN SERPL-MCNC: 17 MG/DL (ref 6–20)
CALCIUM SERPL-MCNC: 9.6 MG/DL (ref 8.4–10.2)
CARDIAC TROPONIN I PNL SERPL HS: 3 NG/L
CHLORIDE SERPL-SCNC: 104 MMOL/L (ref 96–108)
CO2 SERPL-SCNC: 31 MMOL/L (ref 22–33)
CREAT SERPL-MCNC: 0.81 MG/DL (ref 0.4–1.1)
EOSINOPHIL # BLD MANUAL: 0 THOUSAND/UL (ref 0–0.4)
EOSINOPHIL NFR BLD MANUAL: 0 % (ref 0–6)
ERYTHROCYTE [DISTWIDTH] IN BLOOD BY AUTOMATED COUNT: 12.9 % (ref 11.6–15.1)
GFR SERPL CREATININE-BSD FRML MDRD: 85 ML/MIN/1.73SQ M
GLUCOSE SERPL-MCNC: 92 MG/DL (ref 65–140)
HCT VFR BLD AUTO: 37.2 % (ref 34.8–46.1)
HGB BLD-MCNC: 12.3 G/DL (ref 11.5–15.4)
LYMPHOCYTES # BLD AUTO: 3.11 THOUSAND/UL (ref 0.6–4.47)
LYMPHOCYTES # BLD AUTO: 39 % (ref 14–44)
MCH RBC QN AUTO: 29.6 PG (ref 26.8–34.3)
MCHC RBC AUTO-ENTMCNC: 33.1 G/DL (ref 31.4–37.4)
MCV RBC AUTO: 90 FL (ref 82–98)
MONOCYTES # BLD AUTO: 0.48 THOUSAND/UL (ref 0–1.22)
MONOCYTES NFR BLD: 6 % (ref 4–12)
NEUTROPHILS # BLD MANUAL: 4.15 THOUSAND/UL (ref 1.85–7.62)
NEUTS SEG NFR BLD AUTO: 52 % (ref 43–75)
PLATELET # BLD AUTO: 284 THOUSANDS/UL (ref 149–390)
PLATELET BLD QL SMEAR: ADEQUATE
PMV BLD AUTO: 11 FL (ref 8.9–12.7)
POTASSIUM SERPL-SCNC: 4 MMOL/L (ref 3.5–5)
PROT SERPL-MCNC: 7.8 G/DL (ref 6.4–8.3)
RBC # BLD AUTO: 4.15 MILLION/UL (ref 3.81–5.12)
RBC MORPH BLD: NORMAL
SODIUM SERPL-SCNC: 141 MMOL/L (ref 133–145)
VARIANT LYMPHS # BLD AUTO: 3 %
WBC # BLD AUTO: 7.98 THOUSAND/UL (ref 4.31–10.16)

## 2021-12-30 PROCEDURE — 85007 BL SMEAR W/DIFF WBC COUNT: CPT

## 2021-12-30 PROCEDURE — 99284 EMERGENCY DEPT VISIT MOD MDM: CPT

## 2021-12-30 PROCEDURE — 93005 ELECTROCARDIOGRAM TRACING: CPT

## 2021-12-30 PROCEDURE — 36415 COLL VENOUS BLD VENIPUNCTURE: CPT

## 2021-12-30 PROCEDURE — 71045 X-RAY EXAM CHEST 1 VIEW: CPT

## 2021-12-30 PROCEDURE — 99285 EMERGENCY DEPT VISIT HI MDM: CPT

## 2021-12-30 PROCEDURE — 85027 COMPLETE CBC AUTOMATED: CPT

## 2021-12-30 PROCEDURE — 84484 ASSAY OF TROPONIN QUANT: CPT

## 2021-12-30 PROCEDURE — 80053 COMPREHEN METABOLIC PANEL: CPT

## 2021-12-30 NOTE — ED PROVIDER NOTES
History  Chief Complaint   Patient presents with    Shortness of Breath     pt reports SOB for a couple of years  states not worse today, but thought she better get checked  denies any c/p or other complaints  47-year-old female no significant past medical history other than mild anxiety and GERD presents secondary to chest tightness at times associated with shortness of breath  Patient states this been going on for over a month it is intermittent and she is essentially not symptomatic today but she is concerned because she states his strong cardiac history in her family she does not have a doctor and she is concerned she may have a cardiac cause for this  Patient really has no medical history otherwise does not take any meds on a regular basis  She is awake alert oriented normal vital signs on presentation  Prior to Admission Medications   Prescriptions Last Dose Informant Patient Reported? Taking? PARoxetine (PAXIL) 10 mg tablet   No No   Sig: TAKE 1 TABLET BY MOUTH EVERY DAY   meloxicam (MOBIC) 15 mg tablet   Yes No   Sig: Take 15 mg by mouth daily   omeprazole (PriLOSEC) 20 mg delayed release capsule   Yes No   Sig: Take 20 mg by mouth daily before breakfast      Facility-Administered Medications: None       Past Medical History:   Diagnosis Date    Arthritis     Back pain     Depression     GERD (gastroesophageal reflux disease)        Past Surgical History:   Procedure Laterality Date    WISDOM TOOTH EXTRACTION         Family History   Problem Relation Age of Onset    Lung cancer Maternal Uncle     Uterine cancer Maternal Grandmother     Ovarian cancer Maternal Grandmother     Lymphoma Paternal Grandmother     Hypertension Mother     Heart disease Mother         acute    Diabetes Father     Hypertension Father     Heart attack Father     Diabetes Brother     Hypertension Brother      I have reviewed and agree with the history as documented      E-Cigarette/Vaping    E-Cigarette Use Current Every Day User      E-Cigarette/Vaping Substances     Social History     Tobacco Use    Smoking status: Former Smoker     Quit date:      Years since quittin 0    Smokeless tobacco: Never Used   Vaping Use    Vaping Use: Every day   Substance Use Topics    Alcohol use: Yes     Comment: 1 drink/wk- occasionally rarely     Drug use: No       Review of Systems   Constitutional: Negative for chills and fever  HENT: Negative for congestion  Eyes: Negative for visual disturbance  Respiratory: Positive for shortness of breath  Cardiovascular: Negative for chest pain  Gastrointestinal: Negative for abdominal pain  Endocrine: Negative for cold intolerance  Genitourinary: Negative for frequency  Musculoskeletal: Negative for gait problem  Skin: Negative for rash  Neurological: Negative for dizziness  Psychiatric/Behavioral: Negative for behavioral problems and confusion  Physical Exam  Physical Exam  Vitals and nursing note reviewed  Constitutional:       Appearance: She is well-developed  HENT:      Head: Normocephalic and atraumatic  Eyes:      Conjunctiva/sclera: Conjunctivae normal       Pupils: Pupils are equal, round, and reactive to light  Cardiovascular:      Rate and Rhythm: Normal rate and regular rhythm  Heart sounds: Normal heart sounds  Pulmonary:      Effort: Pulmonary effort is normal       Breath sounds: Normal breath sounds  Abdominal:      General: Bowel sounds are normal       Palpations: Abdomen is soft  Musculoskeletal:         General: Normal range of motion  Cervical back: Normal range of motion and neck supple  Skin:     General: Skin is warm and dry  Capillary Refill: Capillary refill takes less than 2 seconds  Neurological:      Mental Status: She is alert and oriented to person, place, and time     Psychiatric:         Behavior: Behavior normal          Vital Signs  ED Triage Vitals [21 1427] Temperature Pulse Respirations Blood Pressure SpO2   98 5 °F (36 9 °C) 63 16 137/68 100 %      Temp Source Heart Rate Source Patient Position - Orthostatic VS BP Location FiO2 (%)   Oral Monitor -- -- --      Pain Score       No Pain           Vitals:    12/30/21 1427   BP: 137/68   Pulse: 63         Visual Acuity      ED Medications  Medications - No data to display    Diagnostic Studies  Results Reviewed     Procedure Component Value Units Date/Time    CBC and differential [[id number]]     Lab Status: No result Specimen: Blood     Comprehensive metabolic panel [[id number]]     Lab Status: No result Specimen: Blood     HS Troponin 0hr (reflex protocol) [[id number]]     Lab Status: No result Specimen: Blood                  XR chest 1 view portable    (Results Pending)              Procedures  Procedures         ED Course                                             MDM  Number of Diagnoses or Management Options  Diagnosis management comments: Patient was monitored emergency department she is medically stable patient's EKG demonstrates sinus bradycardia no acute ST T wave changes patient had a chest x-ray demonstrated no acute findings  Patient's CBC chemistry and troponin were all unremarkable  Plan will be to discharge patient home she be given referral for primary care physician as well in the next week  Disposition  Final diagnoses:   None     ED Disposition     None      Follow-up Information    None         Patient's Medications   Discharge Prescriptions    No medications on file       No discharge procedures on file      PDMP Review     None          ED Provider  Electronically Signed by           Shreyas Fletcher MD  12/30/21 1296

## 2022-01-01 LAB
ATRIAL RATE: 57 BPM
P AXIS: 69 DEGREES
PR INTERVAL: 184 MS
QRS AXIS: -35 DEGREES
QRSD INTERVAL: 90 MS
QT INTERVAL: 428 MS
QTC INTERVAL: 410 MS
T WAVE AXIS: 66 DEGREES
VENTRICULAR RATE: 55 BPM

## 2022-01-01 PROCEDURE — 93010 ELECTROCARDIOGRAM REPORT: CPT | Performed by: INTERNAL MEDICINE

## 2022-01-05 PROCEDURE — 93010 ELECTROCARDIOGRAM REPORT: CPT | Performed by: INTERNAL MEDICINE

## 2022-04-10 ENCOUNTER — HOSPITAL ENCOUNTER (EMERGENCY)
Facility: HOSPITAL | Age: 52
Discharge: HOME | End: 2022-04-10
Attending: EMERGENCY MEDICINE
Payer: COMMERCIAL

## 2022-04-10 ENCOUNTER — APPOINTMENT (EMERGENCY)
Dept: CT IMAGING | Facility: HOSPITAL | Age: 52
End: 2022-04-10
Payer: COMMERCIAL

## 2022-04-10 VITALS
HEIGHT: 62 IN | HEART RATE: 77 BPM | TEMPERATURE: 98.1 F | WEIGHT: 137 LBS | RESPIRATION RATE: 18 BRPM | SYSTOLIC BLOOD PRESSURE: 134 MMHG | OXYGEN SATURATION: 99 % | DIASTOLIC BLOOD PRESSURE: 70 MMHG | BODY MASS INDEX: 25.21 KG/M2

## 2022-04-10 DIAGNOSIS — N12 PYELONEPHRITIS: Primary | ICD-10-CM

## 2022-04-10 LAB
ALBUMIN SERPL BCP-MCNC: 4.1 G/DL (ref 3.5–5)
ALP SERPL-CCNC: 49 U/L (ref 34–104)
ALT SERPL W P-5'-P-CCNC: 11 U/L (ref 7–52)
ANION GAP SERPL CALCULATED.3IONS-SCNC: 8 MMOL/L (ref 4–13)
AST SERPL W P-5'-P-CCNC: 13 U/L (ref 13–39)
BACTERIA UR QL AUTO: ABNORMAL /HPF
BASOPHILS # BLD AUTO: 0.06 THOUSANDS/ΜL (ref 0–0.1)
BASOPHILS NFR BLD AUTO: 1 % (ref 0–1)
BILIRUB SERPL-MCNC: 0.42 MG/DL (ref 0.2–1)
BILIRUB UR QL STRIP: NEGATIVE
BUN SERPL-MCNC: 15 MG/DL (ref 5–25)
CALCIUM SERPL-MCNC: 9.6 MG/DL (ref 8.4–10.2)
CHLORIDE SERPL-SCNC: 104 MMOL/L (ref 96–108)
CLARITY UR: CLEAR
CO2 SERPL-SCNC: 28 MMOL/L (ref 21–32)
COLOR UR: YELLOW
CREAT SERPL-MCNC: 0.86 MG/DL (ref 0.6–1.3)
EOSINOPHIL # BLD AUTO: 0.16 THOUSAND/ΜL (ref 0–0.61)
EOSINOPHIL NFR BLD AUTO: 2 % (ref 0–6)
ERYTHROCYTE [DISTWIDTH] IN BLOOD BY AUTOMATED COUNT: 12.7 % (ref 11.6–15.1)
EXT PREG TEST URINE: NEGATIVE
EXT. CONTROL ED NAV: NORMAL
GFR SERPL CREATININE-BSD FRML MDRD: 79 ML/MIN/1.73SQ M
GLUCOSE SERPL-MCNC: 102 MG/DL (ref 65–140)
GLUCOSE UR STRIP-MCNC: NEGATIVE MG/DL
HCT VFR BLD AUTO: 35.2 % (ref 34.8–46.1)
HGB BLD-MCNC: 11.6 G/DL (ref 11.5–15.4)
HGB UR QL STRIP.AUTO: ABNORMAL
IMM GRANULOCYTES # BLD AUTO: 0.01 THOUSAND/UL (ref 0–0.2)
IMM GRANULOCYTES NFR BLD AUTO: 0 % (ref 0–2)
KETONES UR STRIP-MCNC: NEGATIVE MG/DL
LACTATE SERPL-SCNC: 0.9 MMOL/L (ref 0.5–2)
LEUKOCYTE ESTERASE UR QL STRIP: ABNORMAL
LYMPHOCYTES # BLD AUTO: 2.45 THOUSANDS/ΜL (ref 0.6–4.47)
LYMPHOCYTES NFR BLD AUTO: 35 % (ref 14–44)
MCH RBC QN AUTO: 29.1 PG (ref 26.8–34.3)
MCHC RBC AUTO-ENTMCNC: 33 G/DL (ref 31.4–37.4)
MCV RBC AUTO: 88 FL (ref 82–98)
MONOCYTES # BLD AUTO: 0.42 THOUSAND/ΜL (ref 0.17–1.22)
MONOCYTES NFR BLD AUTO: 6 % (ref 4–12)
NEUTROPHILS # BLD AUTO: 3.94 THOUSANDS/ΜL (ref 1.85–7.62)
NEUTS SEG NFR BLD AUTO: 56 % (ref 43–75)
NITRITE UR QL STRIP: NEGATIVE
NON-SQ EPI CELLS URNS QL MICRO: ABNORMAL /HPF
NRBC BLD AUTO-RTO: 0 /100 WBCS
PH UR STRIP.AUTO: 6.5 [PH]
PLATELET # BLD AUTO: 256 THOUSANDS/UL (ref 149–390)
PMV BLD AUTO: 11.4 FL (ref 8.9–12.7)
POTASSIUM SERPL-SCNC: 3.6 MMOL/L (ref 3.5–5.3)
PROT SERPL-MCNC: 7.4 G/DL (ref 6.4–8.4)
PROT UR STRIP-MCNC: NEGATIVE MG/DL
RBC # BLD AUTO: 3.98 MILLION/UL (ref 3.81–5.12)
RBC #/AREA URNS AUTO: ABNORMAL /HPF
SODIUM SERPL-SCNC: 140 MMOL/L (ref 135–147)
SP GR UR STRIP.AUTO: 1.02 (ref 1–1.03)
UROBILINOGEN UR QL STRIP.AUTO: 0.2 E.U./DL
WBC # BLD AUTO: 7.04 THOUSAND/UL (ref 4.31–10.16)
WBC #/AREA URNS AUTO: ABNORMAL /HPF

## 2022-04-10 PROCEDURE — 81025 URINE PREGNANCY TEST: CPT | Performed by: EMERGENCY MEDICINE

## 2022-04-10 PROCEDURE — 36415 COLL VENOUS BLD VENIPUNCTURE: CPT | Performed by: EMERGENCY MEDICINE

## 2022-04-10 PROCEDURE — 74176 CT ABD & PELVIS W/O CONTRAST: CPT

## 2022-04-10 PROCEDURE — 83605 ASSAY OF LACTIC ACID: CPT | Performed by: EMERGENCY MEDICINE

## 2022-04-10 PROCEDURE — G1004 CDSM NDSC: HCPCS

## 2022-04-10 PROCEDURE — 81001 URINALYSIS AUTO W/SCOPE: CPT | Performed by: EMERGENCY MEDICINE

## 2022-04-10 PROCEDURE — 96374 THER/PROPH/DIAG INJ IV PUSH: CPT

## 2022-04-10 PROCEDURE — 96375 TX/PRO/DX INJ NEW DRUG ADDON: CPT

## 2022-04-10 PROCEDURE — 96361 HYDRATE IV INFUSION ADD-ON: CPT

## 2022-04-10 PROCEDURE — 81003 URINALYSIS AUTO W/O SCOPE: CPT | Performed by: EMERGENCY MEDICINE

## 2022-04-10 PROCEDURE — 99284 EMERGENCY DEPT VISIT MOD MDM: CPT | Performed by: EMERGENCY MEDICINE

## 2022-04-10 PROCEDURE — 99284 EMERGENCY DEPT VISIT MOD MDM: CPT

## 2022-04-10 PROCEDURE — 80053 COMPREHEN METABOLIC PANEL: CPT | Performed by: EMERGENCY MEDICINE

## 2022-04-10 PROCEDURE — 85025 COMPLETE CBC W/AUTO DIFF WBC: CPT | Performed by: EMERGENCY MEDICINE

## 2022-04-10 RX ORDER — CEPHALEXIN 500 MG/1
500 CAPSULE ORAL EVERY 12 HOURS SCHEDULED
Qty: 20 CAPSULE | Refills: 0 | Status: SHIPPED | OUTPATIENT
Start: 2022-04-10 | End: 2022-04-20

## 2022-04-10 RX ORDER — ONDANSETRON 2 MG/ML
4 INJECTION INTRAMUSCULAR; INTRAVENOUS ONCE
Status: COMPLETED | OUTPATIENT
Start: 2022-04-10 | End: 2022-04-10

## 2022-04-10 RX ORDER — KETOROLAC TROMETHAMINE 30 MG/ML
30 INJECTION, SOLUTION INTRAMUSCULAR; INTRAVENOUS ONCE
Status: COMPLETED | OUTPATIENT
Start: 2022-04-10 | End: 2022-04-10

## 2022-04-10 RX ORDER — ONDANSETRON 4 MG/1
4 TABLET, ORALLY DISINTEGRATING ORAL EVERY 6 HOURS PRN
Qty: 20 TABLET | Refills: 0 | Status: SHIPPED | OUTPATIENT
Start: 2022-04-10

## 2022-04-10 RX ADMIN — ONDANSETRON 4 MG: 2 INJECTION INTRAMUSCULAR; INTRAVENOUS at 19:14

## 2022-04-10 RX ADMIN — KETOROLAC TROMETHAMINE 30 MG: 30 INJECTION, SOLUTION INTRAMUSCULAR at 19:15

## 2022-04-10 RX ADMIN — SODIUM CHLORIDE 1000 ML: 0.9 INJECTION, SOLUTION INTRAVENOUS at 19:20

## 2022-04-10 NOTE — Clinical Note
Janeth Day was seen and treated in our emergency department on 4/10/2022  Diagnosis:     BODØ  may return to school on return date  She may return on this date: 04/12/2022         If you have any questions or concerns, please don't hesitate to call        Tricia Ball DO    ______________________________           _______________          _______________  Hospital Representative                              Date                                Time

## 2022-04-11 NOTE — ED PROVIDER NOTES
History  Chief Complaint   Patient presents with    Flank Pain     Pt "I have had it since [place]  I am having a lot of pain under my ribs and it shoots down in my back  I tried the heating pad but my hot flashes get worse  I tried lidocain patches and Motrin  I thought it would go away but its getting worse  I have had kidney issues in the past" Pt denies CP      HPI  This is a 61-year-old female presenting to us with left flank pain  The patient states that she has had the symptoms for the last few days  She denies any associated nausea vomiting  She denies any significant dysuria  She does state that she has a history of multiple urinary tract infections in the past as well as pyelonephritis in the past   The patient denies any history of kidney stones  She states that these symptoms feel like bruises or a pulled muscle    Patient denies any other medical problems at this time  Prior to Admission Medications   Prescriptions Last Dose Informant Patient Reported? Taking?    PARoxetine (PAXIL) 10 mg tablet Not Taking at Unknown time  No No   Sig: TAKE 1 TABLET BY MOUTH EVERY DAY   Patient not taking: Reported on 4/10/2022   meloxicam (MOBIC) 15 mg tablet Not Taking at Unknown time  Yes No   Sig: Take 15 mg by mouth daily   Patient not taking: Reported on 4/10/2022    omeprazole (PriLOSEC) 20 mg delayed release capsule Not Taking at Unknown time  Yes No   Sig: Take 20 mg by mouth daily before breakfast   Patient not taking: Reported on 4/10/2022       Facility-Administered Medications: None       Past Medical History:   Diagnosis Date    Arthritis     Back pain     Depression     GERD (gastroesophageal reflux disease)        Past Surgical History:   Procedure Laterality Date    CARPAL TUNNEL RELEASE Right     WISDOM TOOTH EXTRACTION         Family History   Problem Relation Age of Onset    Lung cancer Maternal Uncle     Uterine cancer Maternal Grandmother     Ovarian cancer Maternal Grandmother     Lymphoma Paternal Grandmother     Hypertension Mother     Heart disease Mother         acute    Diabetes Father     Hypertension Father     Heart attack Father     Diabetes Brother     Hypertension Brother      I have reviewed and agree with the history as documented  E-Cigarette/Vaping    E-Cigarette Use Current Every Day User      E-Cigarette/Vaping Substances     Social History     Tobacco Use    Smoking status: Current Every Day Smoker     Last attempt to quit:      Years since quittin 2    Smokeless tobacco: Never Used    Tobacco comment: E-cig   Vaping Use    Vaping Use: Every day   Substance Use Topics    Alcohol use: Yes     Comment: 1 drink/wk- occasionally rarely     Drug use: No       Review of Systems  Review of systems  Constitutional symptoms:  Negative except as documented in HPI  Skin symptoms: Negative except as documented in HPI  Eye symptoms: Negative except as documented in HPI  ENMT symptoms: Negative except as documented in HPI  Respiratory symptoms: Negative except as documented in HPI  Cardiovascular symptoms: Negative except as documented in HPI  Gastrointestinal symptoms: Negative except as documented in HPI   symptoms: Negative except as documented in HPI  Musculoskeletal symptoms: Negative except as documented in HPI  Neurologic symptoms: Negative except as documented in HPI  Psychiatric symptoms: Negative except as documented in HPI  Endocrine symptoms: Negative except as documented in HPI  Hematologic/lymphatic symptoms: Negative except as documented in HPI  Allergy/immunologic symptoms: Negative except as documented in HPI  Physical Exam  Physical Exam  General:  No acute distress  Skin:  Warm  Head:  Normocephalic, atraumatic  Neck:  Trachea midline  Eye:  Extraocular movements are intact  Cardiovascular:  Regular rate and rhythm    Respiratory:  Lungs are clear to auscultation, respirations are nonlabored, breath sounds are equal bilaterally  Chest wall:  No tenderness  Musculoskeletal:  Normal range of motion, no tenderness, no swelling  Gastrointestinal:  Soft, nontender, guarding:  Negative, rebound:  Negative, bowel sounds:  Normal   Neurologic:  Alert and oriented to person place, time and situation    Patient moving all 4 extremities spontaneously    Vital Signs  ED Triage Vitals   Temperature Pulse Respirations Blood Pressure SpO2   04/10/22 1831 04/10/22 1830 04/10/22 1830 04/10/22 1830 04/10/22 1830   98 1 °F (36 7 °C) 77 18 134/70 99 %      Temp Source Heart Rate Source Patient Position - Orthostatic VS BP Location FiO2 (%)   04/10/22 1831 04/10/22 1830 04/10/22 1830 04/10/22 1830 --   Oral Monitor Sitting Right arm       Pain Score       04/10/22 1831       6           Vitals:    04/10/22 1830   BP: 134/70   Pulse: 77   Patient Position - Orthostatic VS: Sitting         Visual Acuity      ED Medications  Medications   morphine injection 2 mg (2 mg Intravenous Not Given 4/10/22 1924)   ketorolac (TORADOL) injection 30 mg (30 mg Intravenous Given 4/10/22 1915)   sodium chloride 0 9 % bolus 1,000 mL (1,000 mL Intravenous New Bag 4/10/22 1920)   ondansetron (ZOFRAN) injection 4 mg (4 mg Intravenous Given 4/10/22 1914)       Diagnostic Studies  Results Reviewed     Procedure Component Value Units Date/Time    POCT pregnancy, urine [[id number]]  (Normal) Resulted: 04/10/22 1945    Lab Status: Final result Updated: 04/10/22 1946     EXT PREG TEST UR (Ref: Negative) negative     Control valid    Urine Microscopic [[id number]]  (Abnormal) Collected: 04/10/22 1909    Lab Status: Final result Specimen: Urine, Clean Catch Updated: 04/10/22 1941     RBC, UA 4-10 /hpf      WBC, UA 4-10 /hpf      Epithelial Cells Occasional /hpf      Bacteria, UA Occasional /hpf     Lactic acid [[id number]]  (Normal) Collected: 04/10/22 1910    Lab Status: Final result Specimen: Blood from Arm, Left Updated: 04/10/22 1934     LACTIC ACID 0 9 mmol/L     Narrative: Result may be elevated if tourniquet was used during collection      Comprehensive metabolic panel [[id number]] Collected: 04/10/22 1910    Lab Status: Final result Specimen: Blood from Arm, Left Updated: 04/10/22 1934     Sodium 140 mmol/L      Potassium 3 6 mmol/L      Chloride 104 mmol/L      CO2 28 mmol/L      ANION GAP 8 mmol/L      BUN 15 mg/dL      Creatinine 0 86 mg/dL      Glucose 102 mg/dL      Calcium 9 6 mg/dL      AST 13 U/L      ALT 11 U/L      Alkaline Phosphatase 49 U/L      Total Protein 7 4 g/dL      Albumin 4 1 g/dL      Total Bilirubin 0 42 mg/dL      eGFR 79 ml/min/1 73sq m     Narrative:      National Kidney Disease Foundation guidelines for Chronic Kidney Disease (CKD):     Stage 1 with normal or high GFR (GFR > 90 mL/min/1 73 square meters)    Stage 2 Mild CKD (GFR = 60-89 mL/min/1 73 square meters)    Stage 3A Moderate CKD (GFR = 45-59 mL/min/1 73 square meters)    Stage 3B Moderate CKD (GFR = 30-44 mL/min/1 73 square meters)    Stage 4 Severe CKD (GFR = 15-29 mL/min/1 73 square meters)    Stage 5 End Stage CKD (GFR <15 mL/min/1 73 square meters)  Note: GFR calculation is accurate only with a steady state creatinine    UA w Reflex to Microscopic w Reflex to Culture [[id number]]  (Abnormal) Collected: 04/10/22 1909    Lab Status: Final result Specimen: Urine, Clean Catch Updated: 04/10/22 1928     Color, UA Yellow     Clarity, UA Clear     Specific [place], UA 1 020     pH, UA 6 5     Leukocytes, UA 1+     Nitrite, UA Negative     Protein, UA Negative mg/dl      Glucose, UA Negative mg/dl      Ketones, UA Negative mg/dl      Urobilinogen, UA 0 2 E U /dl      Bilirubin, UA Negative     Blood, UA 2+    CBC and differential [[id number]] Collected: 04/10/22 1910    Lab Status: Final result Specimen: Blood from Arm, Left Updated: 04/10/22 1918     WBC 7 04 Thousand/uL      RBC 3 98 Million/uL      Hemoglobin 11 6 g/dL      Hematocrit 35 2 %      MCV 88 fL      MCH 29 1 pg      MCHC 33 0 g/dL      RDW 12 7 %      MPV 11 4 fL      Platelets 935 Thousands/uL      nRBC 0 /100 WBCs      Neutrophils Relative 56 %      Immat GRANS % 0 %      Lymphocytes Relative 35 %      Monocytes Relative 6 %      Eosinophils Relative 2 %      Basophils Relative 1 %      Neutrophils Absolute 3 94 Thousands/µL      Immature Grans Absolute 0 01 Thousand/uL      Lymphocytes Absolute 2 45 Thousands/µL      Monocytes Absolute 0 42 Thousand/µL      Eosinophils Absolute 0 16 Thousand/µL      Basophils Absolute 0 06 Thousands/µL                  CT renal stone study abdomen pelvis wo contrast   Final Result by Belinda Encinas MD (04/10 2036)      No nephrolithiasis or hydroureteronephrosis  Workstation performed: [id number]                    Procedures  Procedures         ED Course                                             MDM  We proceed with CT abdomen as well as laboratory workup  The patient does show some evidence of urinary tract infection  Will treat with antibiotics  No evidence of ureterolithiasis noted  Patient appropriate for discharge home with outpatient follow-up  Disposition  Final diagnoses:   Pyelonephritis     Time reflects when diagnosis was documented in both MDM as applicable and the Disposition within this note     Time User Action Codes Description Comment    4/10/2022  8:49 PM Ken Lawrence Add [N12] Pyelonephritis       ED Disposition     ED Disposition Condition Date/Time Comment    Discharge Stable Sun Apr 10, 2022  8:49 PM Mary Sanchez discharge to home/self care              Follow-up Information     Follow up With Specialties Details Why Contact Info    [facility] Call in 3 days If symptoms worsen, As needed [address] [place] [phone]            Patient's Medications   Discharge Prescriptions    CEPHALEXIN (KEFLEX) 500 MG CAPSULE    Take 1 capsule (500 mg total) by mouth every 12 (twelve) hours for 10 days Start Date: 4/10/2022 End Date: 4/20/2022       Order Dose: 500 mg       Quantity: 20 capsule    Refills: 0    ONDANSETRON (ZOFRAN ODT) 4 MG DISINTEGRATING TABLET    Take 1 tablet (4 mg total) by mouth every 6 (six) hours as needed for nausea or vomiting       Start Date: 4/10/2022 End Date: --       Order Dose: 4 mg       Quantity: 20 tablet    Refills: 0       No discharge procedures on file      PDMP Review     None          ED Provider  Electronically Signed by           Shae Cain DO  04/10/22 2052

## 2022-04-11 NOTE — ED NOTES
Patient transported to [facility], [address] [facility]  04/10/22 2009

## 2022-05-13 ENCOUNTER — HOSPITAL ENCOUNTER (OUTPATIENT)
Dept: CT IMAGING | Facility: HOSPITAL | Age: 52
Discharge: HOME | End: 2022-05-13
Attending: SPECIALIST
Payer: COMMERCIAL

## 2022-05-13 DIAGNOSIS — R31.1 BENIGN ESSENTIAL MICROSCOPIC HEMATURIA: ICD-10-CM

## 2022-05-13 PROCEDURE — 74176 CT ABD & PELVIS W/O CONTRAST: CPT

## 2022-05-13 PROCEDURE — G1004 CDSM NDSC: HCPCS

## 2022-05-29 ENCOUNTER — HOSPITAL ENCOUNTER (EMERGENCY)
Facility: HOSPITAL | Age: 52
Discharge: HOME | End: 2022-05-29
Attending: EMERGENCY MEDICINE | Admitting: EMERGENCY MEDICINE
Payer: COMMERCIAL

## 2022-05-29 VITALS
DIASTOLIC BLOOD PRESSURE: 68 MMHG | TEMPERATURE: 98.1 F | OXYGEN SATURATION: 99 % | RESPIRATION RATE: 16 BRPM | SYSTOLIC BLOOD PRESSURE: 106 MMHG | HEART RATE: 58 BPM

## 2022-05-29 DIAGNOSIS — H57.89 PERIORBITAL SWELLING: Primary | ICD-10-CM

## 2022-05-29 PROCEDURE — 99283 EMERGENCY DEPT VISIT LOW MDM: CPT

## 2022-05-29 PROCEDURE — 99284 EMERGENCY DEPT VISIT MOD MDM: CPT | Performed by: EMERGENCY MEDICINE

## 2022-05-29 RX ORDER — PREDNISONE 20 MG/1
40 TABLET ORAL DAILY
Qty: 8 TABLET | Refills: 0 | Status: SHIPPED | OUTPATIENT
Start: 2022-05-30 | End: 2022-06-03

## 2022-05-29 RX ADMIN — PREDNISONE 50 MG: 20 TABLET ORAL at 06:01

## 2022-05-29 RX ADMIN — FLUORESCEIN SODIUM 1 STRIP: 1 STRIP OPHTHALMIC at 05:48

## 2022-05-29 NOTE — Clinical Note
Tom Macedo was seen and treated in our emergency department on 5/29/2022  Diagnosis:     Beena Bolivar  may return to work on return date  She may return on this date: 05/31/2022         If you have any questions or concerns, please don't hesitate to call        Tracey Stafford MD    ______________________________           _______________          _______________  Hospital Representative                              Date                                Time

## 2022-05-29 NOTE — ED PROVIDER NOTES
History  Chief Complaint   Patient presents with    Eye Swelling     Pt presents to the ED with L eye swelling that started last night  Pt states that she was trimming trees/bushes yesterday, denies injury or being hit by any loose trimmings      Patient is a 53-year-old female seen in the emergency department with concern for left periorbital swelling which began last night  Patient states that she was trimming trees/bushes yesterday, but notes no injury or any obvious foreign body to the left eye  Patient states that she occasionally wears reading glasses, but does not use contact lenses  Patient notes no fever, eye drainage, or vision changes  Patient states that she is not sure if she was possibly bit by an insect  Patient states that she took Benadryl at home, with minimal improvement of symptoms noted  Prior to Admission Medications   Prescriptions Last Dose Informant Patient Reported? Taking?    PARoxetine (PAXIL) 10 mg tablet   No No   Sig: TAKE 1 TABLET BY MOUTH EVERY DAY   Patient not taking: Reported on 4/10/2022   meloxicam (MOBIC) 15 mg tablet   Yes No   Sig: Take 15 mg by mouth daily   Patient not taking: Reported on 4/10/2022    omeprazole (PriLOSEC) 20 mg delayed release capsule   Yes No   Sig: Take 20 mg by mouth daily before breakfast   Patient not taking: Reported on 4/10/2022    ondansetron (Zofran ODT) 4 mg disintegrating tablet   No No   Sig: Take 1 tablet (4 mg total) by mouth every 6 (six) hours as needed for nausea or vomiting      Facility-Administered Medications: None       Past Medical History:   Diagnosis Date    Arthritis     Back pain     Depression     GERD (gastroesophageal reflux disease)        Past Surgical History:   Procedure Laterality Date    CARPAL TUNNEL RELEASE Right     WISDOM TOOTH EXTRACTION         Family History   Problem Relation Age of Onset    Lung cancer Maternal Uncle     Uterine cancer Maternal Grandmother     Ovarian cancer Maternal Grandmother     Lymphoma Paternal Grandmother     Hypertension Mother     Heart disease Mother         acute    Diabetes Father     Hypertension Father     Heart attack Father     Diabetes Brother     Hypertension Brother      I have reviewed and agree with the history as documented  E-Cigarette/Vaping    E-Cigarette Use Current Every Day User      E-Cigarette/Vaping Substances     Social History     Tobacco Use    Smoking status: Current Every Day Smoker     Last attempt to quit:      Years since quittin 4    Smokeless tobacco: Never Used    Tobacco comment: E-cig   Vaping Use    Vaping Use: Every day   Substance Use Topics    Alcohol use: Yes     Comment: 1 drink/wk- occasionally rarely     Drug use: No       Review of Systems   Constitutional: Negative for chills and fever  HENT: Negative for ear pain and sore throat  Eyes: Negative for visual disturbance  Left periorbital swelling   Respiratory: Negative for cough and shortness of breath  Cardiovascular: Negative for chest pain and palpitations  Gastrointestinal: Negative for abdominal pain and vomiting  Musculoskeletal: Negative for arthralgias and back pain  Skin: Negative for pallor and wound  Neurological: Negative for weakness and numbness  Psychiatric/Behavioral: Negative for agitation and confusion  Physical Exam  Physical Exam  Vitals and nursing note reviewed  Constitutional:       General: She is not in acute distress  Appearance: She is well-developed  HENT:      Head: Normocephalic and atraumatic  Right Ear: External ear normal       Left Ear: External ear normal    Eyes:      General: No scleral icterus  Right eye: No discharge  Left eye: No discharge  Extraocular Movements: Extraocular movements intact  Conjunctiva/sclera: Conjunctivae normal       Pupils: Pupils are equal, round, and reactive to light        Comments: Left periorbital edema; no significant erythema, warmth, fluctuance, or tenderness noted   Cardiovascular:      Rate and Rhythm: Bradycardia present  Comments: well-perfused extremities  Pulmonary:      Effort: Pulmonary effort is normal  No respiratory distress  Abdominal:      General: Abdomen is flat  There is no distension  Palpations: Abdomen is soft  Musculoskeletal:         General: No deformity or signs of injury  Cervical back: Normal range of motion and neck supple  Skin:     General: Skin is warm and dry  Neurological:      General: No focal deficit present  Mental Status: She is alert  Cranial Nerves: No cranial nerve deficit  Sensory: No sensory deficit  Psychiatric:         Mood and Affect: Mood normal          Behavior: Behavior normal          Thought Content: Thought content normal          Vital Signs  ED Triage Vitals [05/29/22 0525]   Temperature Pulse Respirations Blood Pressure SpO2   98 1 °F (36 7 °C) 58 16 106/68 99 %      Temp Source Heart Rate Source Patient Position - Orthostatic VS BP Location FiO2 (%)   Oral Monitor Sitting Right arm --      Pain Score       No Pain           Vitals:    05/29/22 0525   BP: 106/68   Pulse: 58   Patient Position - Orthostatic VS: Sitting         Visual Acuity      ED Medications  Medications   fluorescein sodium sterile ophthalmic strip 1 strip (1 strip Left Eye Given 5/29/22 0548)   predniSONE tablet 50 mg (50 mg Oral Given 5/29/22 0601)       Diagnostic Studies  Results Reviewed     None                 No orders to display              Procedures  Procedures         ED Course                                             MDM  Number of Diagnoses or Management Options  Periorbital swelling  Diagnosis management comments: Patient is a 62-year-old female seen in the emergency department with concern for left periorbital edema  Evaluation is not consistent with preseptal cellulitis   Evaluation is consistent with likely allergic reaction/possible insect bite  Plan to treat patient with course of oral steroid medication, and have patient follow up with PCP  Patient was provided good return precautions  Patient stable for discharge home  Discharge instructions were reviewed with patient  Disposition  Final diagnoses:   Periorbital swelling     Time reflects when diagnosis was documented in both MDM as applicable and the Disposition within this note     Time User Action Codes Description Comment    5/29/2022  5:55 AM Ami Hutchinson Add [H57 89] Periorbital swelling       ED Disposition     ED Disposition   Discharge    Condition   Stable    Date/Time   Sun May 29, 2022  5:55 AM    Comment   Ronny Carbajal discharge to home/self care  Follow-up Information     Follow up With Specialties Details Why Contact Info Additional Information    Your primary doctor  Call in 1 day       [place] Call  As needed [address] [phone]  [id number]  , [place], [place], [address]          Patient's Medications   Discharge Prescriptions    PREDNISONE 20 MG TABLET    Take 2 tablets (40 mg total) by mouth daily for 4 days       Start Date: 5/30/2022 End Date: 6/3/2022       Order Dose: 40 mg       Quantity: 8 tablet    Refills: 0       No discharge procedures on file      PDMP Review     None          ED Provider  Electronically Signed by           Kyle Mendez MD  05/29/22 5837

## 2022-05-29 NOTE — DISCHARGE INSTRUCTIONS
Take medication as prescribed  Follow up with your primary doctor, and return to the emergency department for new or worsening symptoms

## 2022-06-06 ENCOUNTER — APPOINTMENT (EMERGENCY)
Dept: RADIOLOGY | Facility: HOSPITAL | Age: 52
End: 2022-06-06
Payer: COMMERCIAL

## 2022-06-06 ENCOUNTER — HOSPITAL ENCOUNTER (EMERGENCY)
Facility: HOSPITAL | Age: 52
Discharge: HOME | End: 2022-06-06
Attending: EMERGENCY MEDICINE
Payer: COMMERCIAL

## 2022-06-06 VITALS
OXYGEN SATURATION: 99 % | DIASTOLIC BLOOD PRESSURE: 66 MMHG | HEART RATE: 66 BPM | SYSTOLIC BLOOD PRESSURE: 115 MMHG | TEMPERATURE: 98.4 F | RESPIRATION RATE: 16 BRPM

## 2022-06-06 DIAGNOSIS — R07.81 RIB PAIN ON RIGHT SIDE: Primary | ICD-10-CM

## 2022-06-06 PROCEDURE — 71101 X-RAY EXAM UNILAT RIBS/CHEST: CPT

## 2022-06-06 PROCEDURE — 99284 EMERGENCY DEPT VISIT MOD MDM: CPT | Performed by: EMERGENCY MEDICINE

## 2022-06-06 PROCEDURE — 99283 EMERGENCY DEPT VISIT LOW MDM: CPT

## 2022-06-06 RX ORDER — HYDROCODONE BITARTRATE AND ACETAMINOPHEN 5; 325 MG/1; MG/1
1 TABLET ORAL EVERY 6 HOURS PRN
Qty: 15 TABLET | Refills: 0 | Status: SHIPPED | OUTPATIENT
Start: 2022-06-06 | End: 2022-06-16

## 2022-06-06 RX ORDER — TRAMADOL HYDROCHLORIDE 50 MG/1
50 TABLET ORAL ONCE
Status: COMPLETED | OUTPATIENT
Start: 2022-06-06 | End: 2022-06-06

## 2022-06-06 RX ADMIN — TRAMADOL HYDROCHLORIDE 50 MG: 50 TABLET, FILM COATED ORAL at 20:28

## 2022-06-06 NOTE — ED PROVIDER NOTES
History  Chief Complaint   Patient presents with    Rib Pain     PT STATES ON Saturday she reached over a railing to grab something and she said she felt "her ribs collapse and a crack noise" Pain is worsening     Patient does not take any antiplatelet agents or blood thinners  She denies any other trauma  She also denies any prior injury to the involved area  History provided by:  Patient   used: No    Chest Pain  Pain location:  R lateral chest  Pain quality: sharp    Pain radiates to:  Does not radiate  Pain radiates to the back: no    Pain severity:  Severe  Onset quality:  Sudden  Duration:  3 days  Timing:  Constant  Progression:  Worsening  Chronicity:  New  Context: trauma    Context comment:  Patient states that she was leaning over a metal railing on Saturday when the pain began  Relieved by:  Nothing  Worsened by:  Certain positions and movement  Ineffective treatments: NSAIDs  Associated symptoms: no abdominal pain, no back pain, no fever, no headache, no nausea, no near-syncope, no numbness, no shortness of breath and not vomiting        Prior to Admission Medications   Prescriptions Last Dose Informant Patient Reported? Taking?    PARoxetine (PAXIL) 10 mg tablet   No No   Sig: TAKE 1 TABLET BY MOUTH EVERY DAY   Patient not taking: Reported on 4/10/2022   meloxicam (MOBIC) 15 mg tablet   Yes No   Sig: Take 15 mg by mouth daily   Patient not taking: Reported on 4/10/2022    omeprazole (PriLOSEC) 20 mg delayed release capsule   Yes No   Sig: Take 20 mg by mouth daily before breakfast   Patient not taking: Reported on 4/10/2022    ondansetron (Zofran ODT) 4 mg disintegrating tablet   No No   Sig: Take 1 tablet (4 mg total) by mouth every 6 (six) hours as needed for nausea or vomiting      Facility-Administered Medications: None       Past Medical History:   Diagnosis Date    Arthritis     Back pain     Depression     GERD (gastroesophageal reflux disease)        Past Surgical History:   Procedure Laterality Date    CARPAL TUNNEL RELEASE Right     WISDOM TOOTH EXTRACTION         Family History   Problem Relation Age of Onset    Lung cancer Maternal Uncle     Uterine cancer Maternal Grandmother     Ovarian cancer Maternal Grandmother     Lymphoma Paternal Grandmother     Hypertension Mother     Heart disease Mother         acute    Diabetes Father     Hypertension Father     Heart attack Father     Diabetes Brother     Hypertension Brother      I have reviewed and agree with the history as documented  E-Cigarette/Vaping    E-Cigarette Use Current Every Day User      E-Cigarette/Vaping Substances     Social History     Tobacco Use    Smoking status: Current Every Day Smoker     Types: Cigarettes     Last attempt to quit:      Years since quittin 4    Smokeless tobacco: Never Used    Tobacco comment: E-cig   Vaping Use    Vaping Use: Every day   Substance Use Topics    Alcohol use: Yes     Comment: occassionally    Drug use: No       Review of Systems   Constitutional: Negative for fever  Respiratory: Negative for shortness of breath  Cardiovascular: Positive for chest pain  Negative for near-syncope  Gastrointestinal: Negative for abdominal pain, nausea and vomiting  Musculoskeletal: Negative for back pain  Skin: Negative for color change  Neurological: Negative for numbness and headaches  All other systems reviewed and are negative  Physical Exam  Physical Exam  Vitals and nursing note reviewed  Constitutional:       Appearance: She is not ill-appearing or toxic-appearing  HENT:      Head: Normocephalic and atraumatic  Mouth/Throat:      Mouth: Mucous membranes are moist    Cardiovascular:      Pulses: Normal pulses  Heart sounds: Normal heart sounds  Pulmonary:      Effort: Pulmonary effort is normal    Chest:      Chest wall: Tenderness present  No deformity  Abdominal:      General: Abdomen is flat   Bowel sounds are normal  There is no distension  Palpations: There is no mass  Tenderness: There is no abdominal tenderness  Skin:     General: Skin is warm  Capillary Refill: Capillary refill takes less than 2 seconds  Neurological:      General: No focal deficit present  Mental Status: She is alert and oriented to person, place, and time  Mental status is at baseline  Motor: No weakness  Psychiatric:         Mood and Affect: Mood normal          Vital Signs  ED Triage Vitals [06/06/22 1946]   Temperature Pulse Respirations Blood Pressure SpO2   98 4 °F (36 9 °C) 66 16 115/66 99 %      Temp Source Heart Rate Source Patient Position - Orthostatic VS BP Location FiO2 (%)   Oral -- Lying Right arm --      Pain Score       8           Vitals:    06/06/22 1946   BP: 115/66   Pulse: 66   Patient Position - Orthostatic VS: Lying         Visual Acuity      ED Medications  Medications   traMADol (ULTRAM) tablet 50 mg (50 mg Oral Given 6/6/22 2028)       Diagnostic Studies  Results Reviewed     None                 XR ribs with pa chest min 3 views RIGHT    (Results Pending)              Procedures  Procedures         ED Course                               SBIRT 22yo+    Flowsheet Row Most Recent Value   SBIRT (23 yo +)    In order to provide better care to our patients, we are screening all of our patients for alcohol and drug use  Would it be okay to ask you these screening questions?  No Filed at: 06/06/2022 1946                    MDM  Number of Diagnoses or Management Options     Amount and/or Complexity of Data Reviewed  Tests in the radiology section of CPT®: ordered and reviewed  Review and summarize past medical records: yes    Risk of Complications, Morbidity, and/or Mortality  Presenting problems: low  Diagnostic procedures: low  Management options: low    Patient Progress  Patient progress: improved      Disposition  Final diagnoses:   Rib pain on right side     Time reflects when diagnosis was documented in both MDM as applicable and the Disposition within this note     Time User Action Codes Description Comment    6/6/2022  8:48 PM Hortencia Martines Add [R07 81] Rib pain on right side       ED Disposition     ED Disposition   Discharge    Condition   Stable    Date/Time   Mon Jun 6, 2022  8:48 PM    Comment   Guadalupe Mckeon discharge to home/self care  Follow-up Information     Follow up With Specialties Details Why Contact Info    Your primary doctor  Call in 1 week            Discharge Medication List as of 6/6/2022  9:00 PM      START taking these medications    Details   HYDROcodone-acetaminophen (Norco) 5-325 mg per tablet Take 1 tablet by mouth every 6 (six) hours as needed for pain for up to 10 days Max Daily Amount: 4 tablets, Starting Mon 6/6/2022, Until Thu 6/16/2022 at 2359, Normal         CONTINUE these medications which have NOT CHANGED    Details   meloxicam (MOBIC) 15 mg tablet Take 15 mg by mouth daily, Starting Thu 4/30/2020, Historical Med      omeprazole (PriLOSEC) 20 mg delayed release capsule Take 20 mg by mouth daily before breakfast, Starting Fri 3/27/2020, Historical Med      ondansetron (Zofran ODT) 4 mg disintegrating tablet Take 1 tablet (4 mg total) by mouth every 6 (six) hours as needed for nausea or vomiting, Starting Sun 4/10/2022, Normal      PARoxetine (PAXIL) 10 mg tablet TAKE 1 TABLET BY MOUTH EVERY DAY, Normal             No discharge procedures on file      PDMP Review     None          ED Provider  Electronically Signed by           Belle Manzano MD  06/07/22 9431

## 2022-06-06 NOTE — Clinical Note
[place] Eliceo was seen and treated in our emergency department on 6/6/2022  Diagnosis:     Dominguez Murtaugh  may return to work on return date  She may return on this date: 06/08/2022         If you have any questions or concerns, please don't hesitate to call        Sammy Cavanaugh MD    ______________________________           _______________          _______________  Hospital Representative                              Date                                Time

## 2022-06-07 RX ORDER — NAPROXEN 500 MG/1
500 TABLET ORAL 2 TIMES DAILY WITH MEALS
Qty: 30 TABLET | Refills: 0 | Status: SHIPPED | OUTPATIENT
Start: 2022-06-07

## 2022-06-07 NOTE — DISCHARGE INSTRUCTIONS
Take pain medication as directed  Be sure to take large deep breath whenever you can to avoid splinting  Follow-up with your primary doctor in a week  Return to the emergency department any time for any new or worsening issues

## 2022-08-04 ENCOUNTER — HOSPITAL ENCOUNTER (OUTPATIENT)
Dept: RADIOLOGY | Facility: HOSPITAL | Age: 52
Discharge: HOME | End: 2022-08-04
Attending: SPECIALIST
Payer: COMMERCIAL

## 2022-08-04 DIAGNOSIS — N20.0 URIC ACID NEPHROLITHIASIS: ICD-10-CM

## 2022-08-04 PROCEDURE — 74018 RADEX ABDOMEN 1 VIEW: CPT

## 2022-08-11 ENCOUNTER — TELEPHONE (OUTPATIENT)
Dept: OBGYN CLINIC | Facility: CLINIC | Age: 52
End: 2022-08-11

## 2022-08-12 NOTE — TELEPHONE ENCOUNTER
Lmom - likely will not be able to accommodate on a Friday but to please call back and review other options

## 2023-01-06 ENCOUNTER — TELEPHONE (OUTPATIENT)
Dept: OBGYN CLINIC | Facility: CLINIC | Age: 53
End: 2023-01-06

## 2023-01-06 NOTE — TELEPHONE ENCOUNTER
Pt lmom  She would like to schedule an appt for menopausal symptoms  She is having a lot of anxiety  She can do thurs or fri anytime

## 2023-01-06 NOTE — TELEPHONE ENCOUNTER
Spoke to patient and scheduled her an appt for feb 3rd  Told her to check out the website [facility]  com

## 2023-01-12 ENCOUNTER — OFFICE VISIT (OUTPATIENT)
Dept: OBGYN CLINIC | Facility: CLINIC | Age: 53
End: 2023-01-12

## 2023-01-12 ENCOUNTER — HOSPITAL ENCOUNTER (OUTPATIENT)
Dept: RADIOLOGY | Facility: HOSPITAL | Age: 53
End: 2023-01-12

## 2023-01-12 VITALS
DIASTOLIC BLOOD PRESSURE: 78 MMHG | WEIGHT: 141.8 LBS | HEIGHT: 62 IN | OXYGEN SATURATION: 100 % | SYSTOLIC BLOOD PRESSURE: 114 MMHG | BODY MASS INDEX: 26.09 KG/M2 | HEART RATE: 73 BPM

## 2023-01-12 DIAGNOSIS — M25.561 PAIN IN BOTH KNEES, UNSPECIFIED CHRONICITY: ICD-10-CM

## 2023-01-12 DIAGNOSIS — M25.562 PAIN IN BOTH KNEES, UNSPECIFIED CHRONICITY: ICD-10-CM

## 2023-01-12 DIAGNOSIS — M17.10 PATELLOFEMORAL ARTHRITIS: ICD-10-CM

## 2023-01-12 DIAGNOSIS — M25.562 PAIN IN BOTH KNEES, UNSPECIFIED CHRONICITY: Primary | ICD-10-CM

## 2023-01-12 DIAGNOSIS — M25.561 PAIN IN BOTH KNEES, UNSPECIFIED CHRONICITY: Primary | ICD-10-CM

## 2023-01-12 RX ORDER — LIDOCAINE HYDROCHLORIDE 10 MG/ML
2 INJECTION, SOLUTION INFILTRATION; PERINEURAL
Status: COMPLETED | OUTPATIENT
Start: 2023-01-12 | End: 2023-01-12

## 2023-01-12 RX ORDER — BUPIVACAINE HYDROCHLORIDE 5 MG/ML
2 INJECTION, SOLUTION EPIDURAL; INTRACAUDAL
Status: COMPLETED | OUTPATIENT
Start: 2023-01-12 | End: 2023-01-12

## 2023-01-12 RX ORDER — TRIAMCINOLONE ACETONIDE 40 MG/ML
80 INJECTION, SUSPENSION INTRA-ARTICULAR; INTRAMUSCULAR
Status: COMPLETED | OUTPATIENT
Start: 2023-01-12 | End: 2023-01-12

## 2023-01-12 RX ADMIN — TRIAMCINOLONE ACETONIDE 80 MG: 40 INJECTION, SUSPENSION INTRA-ARTICULAR; INTRAMUSCULAR at 09:27

## 2023-01-12 RX ADMIN — BUPIVACAINE HYDROCHLORIDE 2 ML: 5 INJECTION, SOLUTION EPIDURAL; INTRACAUDAL at 09:27

## 2023-01-12 RX ADMIN — LIDOCAINE HYDROCHLORIDE 2 ML: 10 INJECTION, SOLUTION INFILTRATION; PERINEURAL at 09:27

## 2023-01-12 NOTE — PROGRESS NOTES
Assessment/Plan   Diagnoses and all orders for this visit:    Pain in both knees, unspecified chronicity    Patellofemoral arthritis  - b/l cortisone injection today  - Start PT  - Ice as needed  - Activity as tolerated  - Follow up with Dr Susan Toney        Subjective   Patient ID: Deirdre Calvillo is a 50 y o  female  Vitals:    23 0847   BP: 114/78   Pulse: 73   SpO2: 100%     56yo female comes in for an evaluation of her b/l knees  She has been having pain for a few years with no specific injury  She has been diagnosed with OA in the past by a [facility]  She works in a [employer]) as a  and has to do a lot of bending and walking on stairs  The pain is dull in character, mild in severity, pain does not radiate and is not associated with numbness          The following portions of the patient's history were reviewed and updated as appropriate: allergies, current medications, past family history, past medical history, past social history, past surgical history and problem list     Review of Systems  Ortho Exam  Past Medical History:   Diagnosis Date   • Arthritis    • Back pain    • Depression    • GERD (gastroesophageal reflux disease)      Past Surgical History:   Procedure Laterality Date   • CARPAL TUNNEL RELEASE Right    • WISDOM TOOTH EXTRACTION       Family History   Problem Relation Age of Onset   • Lung cancer Maternal Uncle    • Uterine cancer Maternal Grandmother    • Ovarian cancer Maternal Grandmother    • Lymphoma Paternal Grandmother    • Hypertension Mother    • Heart disease Mother         acute   • Diabetes Father    • Hypertension Father    • Heart attack Father    • Diabetes Brother    • Hypertension Brother      Social History     Occupational History   • Occupation:    Tobacco Use   • Smoking status: Every Day     Types: Cigarettes     Last attempt to quit: 2014     Years since quittin 0   • Smokeless tobacco: Never   • Tobacco comments:     E-cig Vaping Use   • Vaping Use: Every day   Substance and Sexual Activity   • Alcohol use: Yes     Comment: occassionally   • Drug use: No   • Sexual activity: Not Currently     Partners: Male       Review of Systems   Constitutional: Negative  HENT: Negative  Eyes: Negative  Respiratory: Negative  Cardiovascular: Negative  Gastrointestinal: Negative  Endocrine: Negative  Genitourinary: Negative  Musculoskeletal: As below      Allergic/Immunologic: Negative  Neurological: Negative  Hematological: Negative  Psychiatric/Behavioral: Negative  Objective   Physical Exam      · Constitutional: Awake, Alert, Oriented  · Eyes: EOMI  · Psych: Mood and affect appropriate  · Heart: regular rate   · Lungs: No audible wheezing  · Abdomen: No guarding  · Lymph: no lymphedema  • bilateral Knee:  - Appearance  • No swelling, discoloration, deformity, or ecchymosis  - Effusion  • none  - Palpation  • No tenderness about the medial / lateral joint line, patella, patellar tendon, MCL, LCL, hamstrings, or medial / lateral tibial plateau   - ROM  • Extension: 0 and Flexion: 130  - Special Tests  • Amena's Test negative, Lachman's Test negative, Anterior Drawer Test negative, Posterior Drawer Test negative, Valgus Stress Test negative, Varus Stress Test negative and Patellar apprehension negative  - Motor  • normal 5/5 in all planes  - NVI distally    I have personally reviewed pertinent films in PACS and my interpretation is patellofemoral spurring noted  Large joint arthrocentesis: L knee  Universal Protocol:  Consent: Verbal consent obtained  Risks and benefits: risks, benefits and alternatives were discussed  Consent given by: patient  Time out: Immediately prior to procedure a "time out" was called to verify the correct patient, procedure, equipment, support staff and site/side marked as required    Timeout called at: 1/12/2023 9:26 AM   Patient understanding: patient states understanding of the procedure being performed  Site marked: the operative site was marked  Radiology Images displayed and confirmed  If images not available, report reviewed: imaging studies available  Patient identity confirmed: verbally with patient    Supporting Documentation  Indications: pain   Procedure Details  Location: knee - L knee  Needle size: 22 G  Ultrasound guidance: no  Approach: lateral  Medications administered: 2 mL bupivacaine (PF) 0 5 %; 2 mL lidocaine 1 %; 80 mg triamcinolone acetonide 40 mg/mL    Patient tolerance: patient tolerated the procedure well with no immediate complications  Dressing:  Sterile dressing applied    Large joint arthrocentesis: R knee  Universal Protocol:  Consent: Verbal consent obtained  Risks and benefits: risks, benefits and alternatives were discussed  Consent given by: patient  Time out: Immediately prior to procedure a "time out" was called to verify the correct patient, procedure, equipment, support staff and site/side marked as required  Timeout called at: 1/12/2023 9:26 AM   Patient understanding: patient states understanding of the procedure being performed  Site marked: the operative site was marked  Radiology Images displayed and confirmed   If images not available, report reviewed: imaging studies available  Patient identity confirmed: verbally with patient    Supporting Documentation  Indications: pain   Procedure Details  Location: knee - R knee  Needle size: 22 G  Ultrasound guidance: no  Approach: lateral  Medications administered: 2 mL bupivacaine (PF) 0 5 %; 2 mL lidocaine 1 %; 80 mg triamcinolone acetonide 40 mg/mL    Patient tolerance: patient tolerated the procedure well with no immediate complications  Dressing:  Sterile dressing applied

## 2023-01-26 NOTE — PROGRESS NOTES
Assessment/Plan:     Problem List Items Addressed This Visit        Cardiovascular and Mediastinum    Hot flashes    Relevant Medications    gabapentin (Neurontin) 100 mg capsule   Other Visit Diagnoses     Menopausal symptoms    -  Primary    Relevant Medications    gabapentin (Neurontin) 100 mg capsule    Other Relevant Orders    CBC and differential    Comprehensive metabolic panel    TSH, 3rd generation with Free T4 reflex    HEMOGLOBIN A1C W/ EAG ESTIMATION    Ambulatory Referral to Family Practice    Menopausal vaginal dryness        Relevant Medications    estradiol (ESTRACE VAGINAL) 0 1 mg/g vaginal cream            Referral made for Family Practice to establish care with PCP for routine health exams  Blood work ordered for not only routine health evaluation, but also, complaints of 'dehydration' with excessive thirst and symptoms of hypothyroidism  Discussed in-length lifestyle changes to assist in symptoms of menopause and depression including exercise, healthy diet, avoiding hot flash triggers (I e , spicy/acidic foods and alcohol), meditation, and sleep hygiene  Advised patient to limit vaping and encouraged her to stop  She reports that she is slowly weaning from [facility] [employer]  Discussed that vaping could exacerbate her menopausal symptoms  Reviewed use of paxil that she tried in 2021  She reported that it increased her symptoms and she felt extremely jittery/anxious  Reviewed use of coconut oil for vaginal dryness and lubrication for intercourse  Offered a referral to mental health counseling and reviewed the benefits of CBT  She declined at this time  Reviewed OTC/online therapies that she could trial for menopausal symptoms including hellobonafide  com and Estroven complete multisymptom  Discussed that these supplements can take time to work  Estrace ordered for vaginal dryness  Reviewed utilizing the product only as needed, 2-3 times per week   Discussed that it is an estrogen based product, but topical, so minimal amounts should enter the bloodstream  Regardless, the patient needs to limit vaping due to increased risk of clotting  Patient understood the risk and still desires prescription  Gabapentin ordered for menopausal symptoms  Discussed risks and benefits along with side effects of the medication  Discussed risk of increased depression, even at its lowest dose  Reviewed if depressive symptoms worsen, to please reach out to the office  If SI/HI occur, to seek immediate medical attention at the ED  Discussed Mago Mccray with the patient for help with cost of prescriptions since recent job loss  Subjective:      Patient ID: Buck Lara is a 48 y o  female  Patient presents to the office for menopausal symptoms  She reports severe hot flashes that causes dehydration and makes her feel faint  She is having severe mood swings with panic attacks  She is extremely depressed/anxious and recently lost her job due to these symptoms  These symptoms have been going on for over a year but severity of the symptoms have progressed over the last 2-3 months  She is also having trouble sleeping due to the symptoms  She reports vaginal dryness and pain with IC at insertion  She is currently an every day smoker with vaping of nicotine  She denies history of blood clots, clotting disorders, or family clotting disorders  She reports brittle nails, weight gain, and hair loss/ thinning  LMP was in 2021  Last Pap was 2021 and WNL  She has tried lifestyle changes with increasing fluid intake and limiting spicy/salty foods  She does not regularly see a PCP  The following portions of the patient's history were reviewed and updated as appropriate:   She  has a past medical history of Arthritis, Back pain, Depression, and GERD (gastroesophageal reflux disease)    She   Patient Active Problem List    Diagnosis Date Noted   • Hot flashes 06/11/2020   • Routine gynecological examination 10/31/2019   • Dense breast tissue on mammogram 03/09/2018   • Screening mammogram, encounter for 03/09/2018   • Breast pain 03/07/2018   • Depression 11/07/2017   • Irregular menstrual cycle 11/07/2017     She  has a past surgical history that includes [place] tooth extraction and Carpal tunnel release (Right)  Her family history includes Diabetes in her brother and father; Heart attack in her father; Heart disease in her mother; Hypertension in her brother, father, and mother; Lung cancer in her maternal uncle; Lymphoma in her paternal grandmother; Ovarian cancer in her maternal grandmother; Uterine cancer in her maternal grandmother  She  reports that she quit smoking about 9 years ago  Her smoking use included cigarettes  She has never used smokeless tobacco  She reports that she does not currently use alcohol  She reports that she does not use drugs  Current Outpatient Medications   Medication Sig Dispense Refill   • estradiol (ESTRACE VAGINAL) 0 1 mg/g vaginal cream Use a pea size amount; 2-3 times a week on the vulvar region  42 5 g 0   • gabapentin (Neurontin) 100 mg capsule Take 1 capsule (100 mg total) by mouth daily at bedtime 30 capsule 2   • meloxicam (MOBIC) 15 mg tablet Take 15 mg by mouth daily (Patient not taking: Reported on 2/3/2023)     • naproxen (Naprosyn) 500 mg tablet Take 1 tablet (500 mg total) by mouth 2 (two) times a day with meals (Patient not taking: Reported on 2/3/2023) 30 tablet 0   • omeprazole (PriLOSEC) 20 mg delayed release capsule Take 20 mg by mouth daily before breakfast (Patient not taking: Reported on 4/10/2022)     • ondansetron (Zofran ODT) 4 mg disintegrating tablet Take 1 tablet (4 mg total) by mouth every 6 (six) hours as needed for nausea or vomiting (Patient not taking: Reported on 2/3/2023) 20 tablet 0   • PARoxetine (PAXIL) 10 mg tablet TAKE 1 TABLET BY MOUTH EVERY DAY (Patient not taking: No sig reported) 30 tablet 1     No current facility-administered medications for this visit  Current Outpatient Medications on File Prior to Visit   Medication Sig   • meloxicam (MOBIC) 15 mg tablet Take 15 mg by mouth daily (Patient not taking: Reported on 2/3/2023)   • naproxen (Naprosyn) 500 mg tablet Take 1 tablet (500 mg total) by mouth 2 (two) times a day with meals (Patient not taking: Reported on 2/3/2023)   • omeprazole (PriLOSEC) 20 mg delayed release capsule Take 20 mg by mouth daily before breakfast (Patient not taking: Reported on 4/10/2022)   • ondansetron (Zofran ODT) 4 mg disintegrating tablet Take 1 tablet (4 mg total) by mouth every 6 (six) hours as needed for nausea or vomiting (Patient not taking: Reported on 2/3/2023)   • PARoxetine (PAXIL) 10 mg tablet TAKE 1 TABLET BY MOUTH EVERY DAY (Patient not taking: No sig reported)     No current facility-administered medications on file prior to visit  She has No Known Allergies       Review of Systems   Constitutional: Positive for unexpected weight change  Negative for diaphoresis, fatigue and fever  Unexpected weight gain     Gastrointestinal: Positive for nausea  Negative for abdominal pain, constipation, diarrhea, rectal pain and vomiting  Genitourinary: Positive for dyspareunia and vaginal pain  Negative for dysuria, flank pain, frequency, menstrual problem, pelvic pain, vaginal bleeding and vaginal discharge  Neurological: Positive for light-headedness  Objective:      /70 (BP Location: Left arm, Patient Position: Sitting, Cuff Size: Standard)   Ht 5' 2" (1 575 m)   Wt 64 9 kg (143 lb)   LMP  (LMP Unknown)   BMI 26 16 kg/m²          Physical Exam  Vitals and nursing note reviewed  Constitutional:       General: She is not in acute distress  Appearance: Normal appearance  She is normal weight  HENT:      Head: Normocephalic  Eyes:      Conjunctiva/sclera: Conjunctivae normal    Cardiovascular:      Rate and Rhythm: Normal rate and regular rhythm  Heart sounds: Normal heart sounds  Pulmonary:      Effort: Pulmonary effort is normal       Breath sounds: Normal breath sounds  Abdominal:      General: Abdomen is flat  Palpations: Abdomen is soft  Musculoskeletal:         General: Normal range of motion  Cervical back: Normal range of motion  Right lower leg: No edema  Left lower leg: No edema  Lymphadenopathy:      Cervical: No cervical adenopathy  Skin:     General: Skin is warm and dry  Neurological:      Mental Status: She is alert and oriented to person, place, and time  Psychiatric:         Thought Content: Thought content normal          Judgment: Judgment normal       Comments: She is tearful and appears 'downcast/depressed'

## 2023-02-03 ENCOUNTER — OFFICE VISIT (OUTPATIENT)
Dept: OBGYN CLINIC | Facility: CLINIC | Age: 53
End: 2023-02-03

## 2023-02-03 VITALS
WEIGHT: 143 LBS | SYSTOLIC BLOOD PRESSURE: 126 MMHG | HEIGHT: 62 IN | DIASTOLIC BLOOD PRESSURE: 70 MMHG | BODY MASS INDEX: 26.31 KG/M2

## 2023-02-03 DIAGNOSIS — N95.1 MENOPAUSAL SYMPTOMS: Primary | ICD-10-CM

## 2023-02-03 DIAGNOSIS — R23.2 HOT FLASHES: ICD-10-CM

## 2023-02-03 DIAGNOSIS — N95.1 MENOPAUSAL VAGINAL DRYNESS: ICD-10-CM

## 2023-02-03 RX ORDER — ESTRADIOL 0.1 MG/G
CREAM VAGINAL
Qty: 42.5 G | Refills: 0 | Status: SHIPPED | OUTPATIENT
Start: 2023-02-03

## 2023-02-03 RX ORDER — GABAPENTIN 100 MG/1
100 CAPSULE ORAL
Qty: 30 CAPSULE | Refills: 2 | Status: SHIPPED | OUTPATIENT
Start: 2023-02-03 | End: 2023-03-05

## 2023-02-03 NOTE — PATIENT INSTRUCTIONS
Hellobonafide  com: relizen (hot flashes),   Estroven complete multisymptom   Coconut oil: vaginal dryness   Replens: vaginal dryness     [facility]  com for pharmacy help

## 2023-02-12 ENCOUNTER — APPOINTMENT (EMERGENCY)
Dept: RADIOLOGY | Facility: HOSPITAL | Age: 53
End: 2023-02-12

## 2023-02-12 ENCOUNTER — APPOINTMENT (EMERGENCY)
Dept: CT IMAGING | Facility: HOSPITAL | Age: 53
End: 2023-02-12

## 2023-02-12 ENCOUNTER — HOSPITAL ENCOUNTER (EMERGENCY)
Facility: HOSPITAL | Age: 53
Discharge: HOME | End: 2023-02-12
Attending: EMERGENCY MEDICINE

## 2023-02-12 VITALS
SYSTOLIC BLOOD PRESSURE: 126 MMHG | TEMPERATURE: 100.3 F | WEIGHT: 140 LBS | HEART RATE: 87 BPM | OXYGEN SATURATION: 99 % | RESPIRATION RATE: 18 BRPM | BODY MASS INDEX: 25.76 KG/M2 | HEIGHT: 62 IN | DIASTOLIC BLOOD PRESSURE: 73 MMHG

## 2023-02-12 DIAGNOSIS — R10.9 FLANK PAIN: ICD-10-CM

## 2023-02-12 DIAGNOSIS — R74.01 TRANSAMINITIS: ICD-10-CM

## 2023-02-12 DIAGNOSIS — R11.2 NAUSEA AND VOMITING: ICD-10-CM

## 2023-02-12 DIAGNOSIS — R10.84 GENERALIZED ABDOMINAL PAIN: Primary | ICD-10-CM

## 2023-02-12 LAB
2HR DELTA HS TROPONIN: -2 NG/L
ALBUMIN SERPL BCP-MCNC: 3.8 G/DL (ref 3.5–5)
ALP SERPL-CCNC: 141 U/L (ref 34–104)
ALT SERPL W P-5'-P-CCNC: 327 U/L (ref 7–52)
ANION GAP SERPL CALCULATED.3IONS-SCNC: 9 MMOL/L (ref 4–13)
AST SERPL W P-5'-P-CCNC: 298 U/L (ref 13–39)
BACTERIA UR QL AUTO: ABNORMAL /HPF
BASOPHILS # BLD AUTO: 0.03 THOUSANDS/ÂΜL (ref 0–0.1)
BASOPHILS NFR BLD AUTO: 1 % (ref 0–1)
BILIRUB SERPL-MCNC: 0.41 MG/DL (ref 0.2–1)
BILIRUB UR QL STRIP: NEGATIVE
BUN SERPL-MCNC: 8 MG/DL (ref 5–25)
CALCIUM SERPL-MCNC: 9.3 MG/DL (ref 8.4–10.2)
CARDIAC TROPONIN I PNL SERPL HS: 5 NG/L
CARDIAC TROPONIN I PNL SERPL HS: 7 NG/L
CHLORIDE SERPL-SCNC: 101 MMOL/L (ref 96–108)
CLARITY UR: CLEAR
CO2 SERPL-SCNC: 26 MMOL/L (ref 21–32)
COLOR UR: YELLOW
CREAT SERPL-MCNC: 0.76 MG/DL (ref 0.6–1.3)
EOSINOPHIL # BLD AUTO: 0.12 THOUSAND/ÂΜL (ref 0–0.61)
EOSINOPHIL NFR BLD AUTO: 2 % (ref 0–6)
ERYTHROCYTE [DISTWIDTH] IN BLOOD BY AUTOMATED COUNT: 12.8 % (ref 11.6–15.1)
FLUAV RNA RESP QL NAA+PROBE: NEGATIVE
FLUBV RNA RESP QL NAA+PROBE: NEGATIVE
GFR SERPL CREATININE-BSD FRML MDRD: 91 ML/MIN/1.73SQ M
GLUCOSE SERPL-MCNC: 100 MG/DL (ref 65–140)
GLUCOSE UR STRIP-MCNC: NEGATIVE MG/DL
HCG SERPL QL: NEGATIVE
HCT VFR BLD AUTO: 37.5 % (ref 34.8–46.1)
HGB BLD-MCNC: 12.4 G/DL (ref 11.5–15.4)
HGB UR QL STRIP.AUTO: ABNORMAL
IMM GRANULOCYTES # BLD AUTO: 0.06 THOUSAND/UL (ref 0–0.2)
IMM GRANULOCYTES NFR BLD AUTO: 1 % (ref 0–2)
KETONES UR STRIP-MCNC: NEGATIVE MG/DL
LEUKOCYTE ESTERASE UR QL STRIP: NEGATIVE
LIPASE SERPL-CCNC: 29 U/L (ref 11–82)
LYMPHOCYTES # BLD AUTO: 0.64 THOUSANDS/ÂΜL (ref 0.6–4.47)
LYMPHOCYTES NFR BLD AUTO: 12 % (ref 14–44)
MAGNESIUM SERPL-MCNC: 1.7 MG/DL (ref 1.9–2.7)
MCH RBC QN AUTO: 29 PG (ref 26.8–34.3)
MCHC RBC AUTO-ENTMCNC: 33.1 G/DL (ref 31.4–37.4)
MCV RBC AUTO: 88 FL (ref 82–98)
MONOCYTES # BLD AUTO: 0.66 THOUSAND/ÂΜL (ref 0.17–1.22)
MONOCYTES NFR BLD AUTO: 12 % (ref 4–12)
NEUTROPHILS # BLD AUTO: 3.84 THOUSANDS/ÂΜL (ref 1.85–7.62)
NEUTS SEG NFR BLD AUTO: 72 % (ref 43–75)
NITRITE UR QL STRIP: NEGATIVE
NON-SQ EPI CELLS URNS QL MICRO: ABNORMAL /HPF
NRBC BLD AUTO-RTO: 0 /100 WBCS
PH UR STRIP.AUTO: 7 [PH]
PLATELET # BLD AUTO: 245 THOUSANDS/UL (ref 149–390)
PMV BLD AUTO: 10.8 FL (ref 8.9–12.7)
POTASSIUM SERPL-SCNC: 3.8 MMOL/L (ref 3.5–5.3)
PROT SERPL-MCNC: 7.4 G/DL (ref 6.4–8.4)
PROT UR STRIP-MCNC: NEGATIVE MG/DL
RBC # BLD AUTO: 4.27 MILLION/UL (ref 3.81–5.12)
RBC #/AREA URNS AUTO: ABNORMAL /HPF
RSV RNA RESP QL NAA+PROBE: NEGATIVE
SARS-COV-2 RNA RESP QL NAA+PROBE: NEGATIVE
SODIUM SERPL-SCNC: 136 MMOL/L (ref 135–147)
SP GR UR STRIP.AUTO: <=1.005 (ref 1–1.03)
UROBILINOGEN UR QL STRIP.AUTO: 0.2 E.U./DL
WBC # BLD AUTO: 5.35 THOUSAND/UL (ref 4.31–10.16)
WBC #/AREA URNS AUTO: ABNORMAL /HPF

## 2023-02-12 RX ORDER — DICYCLOMINE HCL 20 MG
20 TABLET ORAL ONCE
Status: COMPLETED | OUTPATIENT
Start: 2023-02-12 | End: 2023-02-12

## 2023-02-12 RX ORDER — ONDANSETRON 2 MG/ML
4 INJECTION INTRAMUSCULAR; INTRAVENOUS ONCE
Status: COMPLETED | OUTPATIENT
Start: 2023-02-12 | End: 2023-02-12

## 2023-02-12 RX ORDER — CEPHALEXIN 250 MG/1
500 CAPSULE ORAL ONCE
Status: COMPLETED | OUTPATIENT
Start: 2023-02-12 | End: 2023-02-12

## 2023-02-12 RX ORDER — CEPHALEXIN 500 MG/1
500 CAPSULE ORAL EVERY 12 HOURS SCHEDULED
Qty: 14 CAPSULE | Refills: 0 | Status: SHIPPED | OUTPATIENT
Start: 2023-02-12 | End: 2023-02-19

## 2023-02-12 RX ORDER — KETOROLAC TROMETHAMINE 30 MG/ML
15 INJECTION, SOLUTION INTRAMUSCULAR; INTRAVENOUS ONCE
Status: COMPLETED | OUTPATIENT
Start: 2023-02-12 | End: 2023-02-12

## 2023-02-12 RX ORDER — DICYCLOMINE HCL 20 MG
20 TABLET ORAL 2 TIMES DAILY
Qty: 10 TABLET | Refills: 0 | Status: SHIPPED | OUTPATIENT
Start: 2023-02-12 | End: 2023-02-17

## 2023-02-12 RX ORDER — ONDANSETRON 4 MG/1
4 TABLET, FILM COATED ORAL EVERY 8 HOURS PRN
Qty: 15 TABLET | Refills: 0 | Status: SHIPPED | OUTPATIENT
Start: 2023-02-12 | End: 2023-02-17

## 2023-02-12 RX ORDER — MAGNESIUM SULFATE HEPTAHYDRATE 40 MG/ML
2 INJECTION, SOLUTION INTRAVENOUS ONCE
Status: COMPLETED | OUTPATIENT
Start: 2023-02-12 | End: 2023-02-12

## 2023-02-12 RX ADMIN — DICYCLOMINE HYDROCHLORIDE 20 MG: 20 TABLET ORAL at 12:13

## 2023-02-12 RX ADMIN — IOHEXOL 100 ML: 350 INJECTION, SOLUTION INTRAVENOUS at 13:31

## 2023-02-12 RX ADMIN — KETOROLAC TROMETHAMINE 15 MG: 30 INJECTION, SOLUTION INTRAMUSCULAR at 12:12

## 2023-02-12 RX ADMIN — ONDANSETRON 4 MG: 2 INJECTION INTRAMUSCULAR; INTRAVENOUS at 12:12

## 2023-02-12 RX ADMIN — SODIUM CHLORIDE 1000 ML: 0.9 INJECTION, SOLUTION INTRAVENOUS at 12:07

## 2023-02-12 RX ADMIN — CEPHALEXIN 500 MG: 250 CAPSULE ORAL at 15:02

## 2023-02-12 RX ADMIN — MAGNESIUM SULFATE HEPTAHYDRATE 2 G: 40 INJECTION, SOLUTION INTRAVENOUS at 13:55

## 2023-02-12 NOTE — ED NOTES
Pt ambulating to the bathroom with steady gait     Anshu Gomez, [address] [facility]  02/12/23 [id number]

## 2023-02-12 NOTE — ED PROVIDER NOTES
History  Chief Complaint   Patient presents with   • Fever - 9 weeks to 74 years     C/o vomiting, diarrhea and fever since Thursday  61-year-old female with history of depression, arthritis, GERD who presents for evaluation of abdominal pain, vomiting, diarrhea  Reports symptoms for the past 5 days  She initially had urinary symptoms prompting [facility]   She was diagnosed with a urinary tract infection and placed on Macrobid  The following day, she developed generalized abdominal pain, vomiting, and diarrhea  She has had multiple episodes of nonbloody nonbilious vomiting  She has had fevers as high as 105 Fahrenheit reportedly  She has been taking Tylenol and ibuprofen for her symptoms with minimal relief  She complains of chest pain associated with her symptoms as well  She has baseline shortness of breath which is unchanged  She denies cough, sore throat  Prior to Admission Medications   Prescriptions Last Dose Informant Patient Reported? Taking? PARoxetine (PAXIL) 10 mg tablet   No No   Sig: TAKE 1 TABLET BY MOUTH EVERY DAY   Patient not taking: No sig reported   estradiol (ESTRACE VAGINAL) 0 1 mg/g vaginal cream   No No   Sig: Use a pea size amount; 2-3 times a week on the vulvar region     gabapentin (Neurontin) 100 mg capsule   No No   Sig: Take 1 capsule (100 mg total) by mouth daily at bedtime   meloxicam (MOBIC) 15 mg tablet   Yes No   Sig: Take 15 mg by mouth daily   Patient not taking: Reported on 2/3/2023   naproxen (Naprosyn) 500 mg tablet   No No   Sig: Take 1 tablet (500 mg total) by mouth 2 (two) times a day with meals   Patient not taking: Reported on 2/3/2023   omeprazole (PriLOSEC) 20 mg delayed release capsule   Yes No   Sig: Take 20 mg by mouth daily before breakfast   Patient not taking: Reported on 4/10/2022   ondansetron (Zofran ODT) 4 mg disintegrating tablet   No No   Sig: Take 1 tablet (4 mg total) by mouth every 6 (six) hours as needed for nausea or vomiting   Patient not taking: Reported on 2/3/2023      Facility-Administered Medications: None       Past Medical History:   Diagnosis Date   • Arthritis    • Back pain    • Depression    • GERD (gastroesophageal reflux disease)        Past Surgical History:   Procedure Laterality Date   • CARPAL TUNNEL RELEASE Right    • WISDOM TOOTH EXTRACTION         Family History   Problem Relation Age of Onset   • Lung cancer Maternal Uncle    • Uterine cancer Maternal Grandmother    • Ovarian cancer Maternal Grandmother    • Lymphoma Paternal Grandmother    • Hypertension Mother    • Heart disease Mother         acute   • Diabetes Father    • Hypertension Father    • Heart attack Father    • Diabetes Brother    • Hypertension Brother      I have reviewed and agree with the history as documented  E-Cigarette/Vaping   • E-Cigarette Use Current Every Day User      E-Cigarette/Vaping Substances     Social History     Tobacco Use   • Smoking status: Former     Types: Cigarettes     Quit date:      Years since quittin 1   • Smokeless tobacco: Never   • Tobacco comments:     E-cig   Vaping Use   • Vaping Use: Every day   Substance Use Topics   • Alcohol use: Not Currently     Comment: occassionally   • Drug use: No       Review of Systems   Constitutional: Positive for fatigue and fever  HENT: Negative for congestion and sore throat  Respiratory: Positive for shortness of breath  Negative for cough  Cardiovascular: Positive for chest pain  Gastrointestinal: Positive for abdominal pain, diarrhea, nausea and vomiting  Genitourinary: Positive for flank pain  Negative for hematuria  Musculoskeletal: Negative for gait problem  Skin: Negative for rash  Neurological: Negative for syncope, weakness and light-headedness  All other systems reviewed and are negative  Physical Exam  Physical Exam  Vitals and nursing note reviewed     Constitutional:       General: She is not in acute distress  Appearance: She is well-developed  She is not ill-appearing  HENT:      Head: Normocephalic and atraumatic  Nose: Nose normal       Mouth/Throat:      Mouth: Mucous membranes are dry  Eyes:      Extraocular Movements: Extraocular movements intact  Conjunctiva/sclera: Conjunctivae normal    Cardiovascular:      Rate and Rhythm: Normal rate and regular rhythm  Heart sounds: No murmur heard  No friction rub  No gallop  Pulmonary:      Effort: Pulmonary effort is normal       Breath sounds: Normal breath sounds  No wheezing, rhonchi or rales  Abdominal:      General: There is no distension  Palpations: Abdomen is soft  Tenderness: There is generalized abdominal tenderness  There is no guarding or rebound  Musculoskeletal:         General: No swelling or tenderness  Normal range of motion  Cervical back: Normal range of motion and neck supple  Skin:     General: Skin is warm and dry  Coloration: Skin is not pale  Findings: No rash  Neurological:      General: No focal deficit present  Mental Status: She is alert and oriented to person, place, and time     Psychiatric:         Behavior: Behavior normal          Vital Signs  ED Triage Vitals [02/12/23 1118]   Temperature Pulse Respirations Blood Pressure SpO2   100 2 °F (37 9 °C) 87 18 126/73 99 %      Temp Source Heart Rate Source Patient Position - Orthostatic VS BP Location FiO2 (%)   Oral -- -- -- --      Pain Score       7           Vitals:    02/12/23 1118   BP: 126/73   Pulse: 87         Visual Acuity      ED Medications  Medications   ondansetron (ZOFRAN) injection 4 mg (4 mg Intravenous Given 2/12/23 1212)   sodium chloride 0 9 % bolus 1,000 mL (0 mL Intravenous Stopped 2/12/23 1456)   ketorolac (TORADOL) injection 15 mg (15 mg Intravenous Given 2/12/23 1212)   dicyclomine (BENTYL) tablet 20 mg (20 mg Oral Given 2/12/23 1213)   magnesium sulfate 2 g/50 mL IVPB (premix) 2 g (0 g Intravenous Stopped 2/12/23 1456)   iohexol (OMNIPAQUE) 350 MG/ML injection (SINGLE-DOSE) 100 mL (100 mL Intravenous Given 2/12/23 1331)   cephalexin (KEFLEX) capsule 500 mg (500 mg Oral Given 2/12/23 1502)       Diagnostic Studies  Results Reviewed     Procedure Component Value Units Date/Time    HS Troponin I 2hr [[id number]]  (Normal) Collected: 02/12/23 1358    Lab Status: Final result Specimen: Blood from Arm, Left Updated: 02/12/23 1433     hs TnI 2hr 5 ng/L      Delta 2hr hsTnI -2 ng/L     Urine Microscopic [[id number]]  (Abnormal) Collected: 02/12/23 1408    Lab Status: Final result Specimen: Urine, Clean Catch Updated: 02/12/23 1423     RBC, UA 4-10 /hpf      WBC, UA 0-5 /hpf      Epithelial Cells Occasional /hpf      Bacteria, UA Occasional /hpf     UA w Reflex to Microscopic w Reflex to Culture [[id number]]  (Abnormal) Collected: 02/12/23 1408    Lab Status: Final result Specimen: Urine, Clean Catch Updated: 02/12/23 1414     Color, UA Yellow     Clarity, UA Clear     Specific Gravity, UA <=1 005     pH, UA 7 0     Leukocytes, UA Negative     Nitrite, UA Negative     Protein, UA Negative mg/dl      Glucose, UA Negative mg/dl      Ketones, UA Negative mg/dl      Urobilinogen, UA 0 2 E U /dl      Bilirubin, UA Negative     Occult Blood, UA 2+    HS Troponin I 4hr [[id number]]     Lab Status: No result Specimen: Blood     hCG, qualitative pregnancy [[id number]]  (Normal) Collected: 02/12/23 1208    Lab Status: Final result Specimen: Blood from Arm, Left Updated: 02/12/23 1304     Preg, Serum Negative    FLU/RSV/COVID - if FLU/RSV clinically relevant [[id number]]  (Normal) Collected: 02/12/23 1208    Lab Status: Final result Specimen: Nares from Nose Updated: 02/12/23 1300     SARS-CoV-2 Negative     INFLUENZA A PCR Negative     INFLUENZA B PCR Negative     RSV PCR Negative    Narrative:      FOR PEDIATRIC PATIENTS - copy/paste COVID Guidelines URL to browser: [email or web address]    SARS-CoV-2 assay is a Nucleic Acid Amplification assay intended for the  qualitative detection of nucleic acid from SARS-CoV-2 in nasopharyngeal  swabs  Results are for the presumptive identification of SARS-CoV-2 RNA  Positive results are indicative of infection with SARS-CoV-2, the virus  causing COVID-19, but do not rule out bacterial infection or co-infection  with other viruses  Laboratories within the [place] and its  territories are required to report all positive results to the appropriate  public health authorities  Negative results do not preclude SARS-CoV-2  infection and should not be used as the sole basis for treatment or other  patient management decisions  Negative results must be combined with  clinical observations, patient history, and epidemiological information  This test has not been FDA cleared or approved  This test has been authorized by FDA under an Emergency Use Authorization  (EUA)  This test is only authorized for the duration of time the  declaration that circumstances exist justifying the authorization of the  emergency use of an in vitro diagnostic tests for detection of SARS-CoV-2  virus and/or diagnosis of COVID-19 infection under section 564(b)(1) of  the Act, 21 U  S C  753VDZ-2(Z)(1), unless the authorization is terminated  or revoked sooner  The test has been validated but independent review by FDA  and CLIA is pending  Test performed using [employer] Gene[employer]pert: This RT-PCR assay targets N2,  a region unique to SARS-CoV-2  A conserved region in the E-gene was chosen  for pan-Sarbecovirus detection which includes SARS-CoV-2  According to CMS-2020-01-R, this platform meets the definition of high-throughput technology      HS Troponin 0hr (reflex protocol) [[id number]]  (Normal) Collected: 02/12/23 1208    Lab Status: Final result Specimen: Blood from Arm, Left Updated: 02/12/23 1247     hs TnI 0hr 7 ng/L     Comprehensive metabolic panel [[id number]]  (Abnormal) Collected: 02/12/23 1208    Lab Status: Final result Specimen: Blood from Arm, Left Updated: 02/12/23 1237     Sodium 136 mmol/L      Potassium 3 8 mmol/L      Chloride 101 mmol/L      CO2 26 mmol/L      ANION GAP 9 mmol/L      BUN 8 mg/dL      Creatinine 0 76 mg/dL      Glucose 100 mg/dL      Calcium 9 3 mg/dL       U/L       U/L      Alkaline Phosphatase 141 U/L      Total Protein 7 4 g/dL      Albumin 3 8 g/dL      Total Bilirubin 0 41 mg/dL      eGFR 91 ml/min/1 73sq m     Narrative:      National Kidney Disease Foundation guidelines for Chronic Kidney Disease (CKD):   •  Stage 1 with normal or high GFR (GFR > 90 mL/min/1 73 square meters)  •  Stage 2 Mild CKD (GFR = 60-89 mL/min/1 73 square meters)  •  Stage 3A Moderate CKD (GFR = 45-59 mL/min/1 73 square meters)  •  Stage 3B Moderate CKD (GFR = 30-44 mL/min/1 73 square meters)  •  Stage 4 Severe CKD (GFR = 15-29 mL/min/1 73 square meters)  •  Stage 5 End Stage CKD (GFR <15 mL/min/1 73 square meters)  Note: GFR calculation is accurate only with a steady state creatinine    Lipase [[id number]]  (Normal) Collected: 02/12/23 1208    Lab Status: Final result Specimen: Blood from Arm, Left Updated: 02/12/23 1237     Lipase 29 u/L     Magnesium [[id number]]  (Abnormal) Collected: 02/12/23 1208    Lab Status: Final result Specimen: Blood from Arm, Left Updated: 02/12/23 1237     Magnesium 1 7 mg/dL     CBC and differential [[id number]]  (Abnormal) Collected: 02/12/23 1208    Lab Status: Final result Specimen: Blood from Arm, Left Updated: 02/12/23 1222     WBC 5 35 Thousand/uL      RBC 4 27 Million/uL      Hemoglobin 12 4 g/dL      Hematocrit 37 5 %      MCV 88 fL      MCH 29 0 pg      MCHC 33 1 g/dL      RDW 12 8 %      MPV 10 8 fL      Platelets 228 Thousands/uL      nRBC 0 /100 WBCs      Neutrophils Relative 72 %      Immat GRANS % 1 %      Lymphocytes Relative 12 %      Monocytes Relative 12 %      Eosinophils Relative 2 %      Basophils Relative 1 %      Neutrophils Absolute 3 84 Thousands/µL      Immature Grans Absolute 0 06 Thousand/uL      Lymphocytes Absolute 0 64 Thousands/µL      Monocytes Absolute 0 66 Thousand/µL      Eosinophils Absolute 0 12 Thousand/µL      Basophils Absolute 0 03 Thousands/µL                  CT abdomen pelvis with contrast   Final Result by Alvaro Vo MD (02/12 1426)      Small pericardial effusion of uncertain etiology or significance  No acute abdominopelvic pathology seen         Workstation performed: [id number]         XR chest 1 view portable   ED Interpretation by Chepe Maldonado MD (02/12 1304)   No acute cardiopulmonary abnormality  Final Result by Red Leslie MD (02/12 1451)      No acute cardiopulmonary disease  Workstation performed: [id number]                    Procedures  Procedures         ED Course  ED Course as of 02/12/23 1652   Sun Feb 12, 2023   1213 Procedure Note: EKG  Date/Time: 02/12/23 12:04 PM   Interpreted by: Ortega Fabian  Indications / Diagnosis: CP  ECG reviewed by me, the ED Provider: yes   The EKG demonstrates:  Rhythm: rate 65, normal sinus  Intervals: normal intervals  Axis: normal axis  QRS/Blocks: normal QRS  ST Changes: No acute ST Changes, no STD/PAYAL     1222 CBC and differential(!)   1243 Comprehensive metabolic panel(!)  New transaminitis   1248 hs TnI 0hr: 7   1300 FLU/RSV/COVID - if FLU/RSV clinically relevant   1416 UA w Reflex to Microscopic w Reflex to Culture(!)   1426 Urine Microscopic(!)   1439 hs TnI 2hr: 5   1440 Patient re-evaluated  Feeling improved after meds  Updated on results  Comfortable with discharge  SBIRT 20yo+    Flowsheet Row Most Recent Value   SBIRT (23 yo +)    In order to provide better care to our patients, we are screening all of our patients for alcohol and drug use   Would it be okay to ask you these screening questions? No Filed at: 02/12/2023 1243                    Medical Decision Making  51-year-old female presenting for evaluation of abdominal pain, vomiting, diarrhea  No peritoneal signs on exam   Differential diagnoses include but not limited to gastroenteritis, influenza, pancreatitis, cholecystitis, colitis, pyelonephritis  Labs significant for mild hypomagnesemia which was repleted as well as new mild transaminitis  CT abdomen and pelvis unremarkable  Viral testing negative  Patient's symptoms are likely related to either viral gastroenteritis versus pyelonephritis given worsening symptoms despite Macrobid  Will transition to Keflex  Advised follow-up with primary care physician as well as gastroenterology regarding her transaminitis  Return precautions discussed  Flank pain: acute illness or injury  Generalized abdominal pain: acute illness or injury  Nausea and vomiting: acute illness or injury  Transaminitis: acute illness or injury  Amount and/or Complexity of Data Reviewed  Labs: ordered  Decision-making details documented in ED Course  Radiology: ordered and independent interpretation performed  ECG/medicine tests: ordered and independent interpretation performed  Decision-making details documented in ED Course  Risk  Prescription drug management            Disposition  Final diagnoses:   Generalized abdominal pain   Flank pain   Nausea and vomiting   Transaminitis     Time reflects when diagnosis was documented in both MDM as applicable and the Disposition within this note     Time User Action Codes Description Comment    2/12/2023  2:42 PM Dollene Brace Add [R10 84] Generalized abdominal pain     2/12/2023  2:42 PM Dollene Brace Add [R10 9] Flank pain     2/12/2023  2:42 PM Dollene Brace Add [R11 2] Nausea and vomiting     2/12/2023  2:44 PM Dollene Brace Add [R74 01] Transaminitis       ED Disposition     ED Disposition   Discharge    Condition   Stable    Date/Time   Sun Feb 12, 2023  2:41 PM    Comment   Genevieve Darryn discharge to home/self care  Follow-up Information    None         Discharge Medication List as of 2/12/2023  2:44 PM      START taking these medications    Details   cephalexin (KEFLEX) 500 mg capsule Take 1 capsule (500 mg total) by mouth every 12 (twelve) hours for 7 days, Starting Sun 2/12/2023, Until Sun 2/19/2023, Normal      dicyclomine (BENTYL) 20 mg tablet Take 1 tablet (20 mg total) by mouth 2 (two) times a day for 5 days, Starting Sun 2/12/2023, Until Fri 2/17/2023, Normal      ondansetron (ZOFRAN) 4 mg tablet Take 1 tablet (4 mg total) by mouth every 8 (eight) hours as needed for nausea or vomiting for up to 5 days, Starting Sun 2/12/2023, Until Fri 2/17/2023 at 2359, Normal         CONTINUE these medications which have NOT CHANGED    Details   estradiol (ESTRACE VAGINAL) 0 1 mg/g vaginal cream Use a pea size amount; 2-3 times a week on the vulvar region  , Normal      gabapentin (Neurontin) 100 mg capsule Take 1 capsule (100 mg total) by mouth daily at bedtime, Starting Fri 2/3/2023, Until Sun 3/5/2023, Normal      meloxicam (MOBIC) 15 mg tablet Take 15 mg by mouth daily, Starting Thu 4/30/2020, Historical Med      naproxen (Naprosyn) 500 mg tablet Take 1 tablet (500 mg total) by mouth 2 (two) times a day with meals, Starting Tue 6/7/2022, Normal      omeprazole (PriLOSEC) 20 mg delayed release capsule Take 20 mg by mouth daily before breakfast, Starting Fri 3/27/2020, Historical Med      ondansetron (Zofran ODT) 4 mg disintegrating tablet Take 1 tablet (4 mg total) by mouth every 6 (six) hours as needed for nausea or vomiting, Starting Sun 4/10/2022, Normal      PARoxetine (PAXIL) 10 mg tablet TAKE 1 TABLET BY MOUTH EVERY DAY, Normal                 PDMP Review     None          ED Provider  Electronically Signed by           Gin Romero MD  02/12/23 9010

## 2023-02-12 NOTE — DISCHARGE INSTRUCTIONS
Follow-up with your primary care physician as well as GI  Take the prescribed medications as directed  Please return to the emergency department if you develop worsening symptoms, severe pain, uncontrolled vomiting, or anything else concerning to you

## 2023-02-13 LAB
ATRIAL RATE: 65 BPM
P AXIS: 61 DEGREES
PR INTERVAL: 166 MS
QRS AXIS: -15 DEGREES
QRSD INTERVAL: 70 MS
QT INTERVAL: 374 MS
QTC INTERVAL: 388 MS
T WAVE AXIS: 50 DEGREES
VENTRICULAR RATE: 65 BPM

## 2023-03-03 DIAGNOSIS — N95.1 MENOPAUSAL VAGINAL DRYNESS: ICD-10-CM

## 2023-03-04 RX ORDER — ESTRADIOL 0.1 MG/G
CREAM VAGINAL
Qty: 42.5 G | Refills: 0 | Status: SHIPPED | OUTPATIENT
Start: 2023-03-04

## 2023-03-13 ENCOUNTER — TELEPHONE (OUTPATIENT)
Dept: OBGYN CLINIC | Facility: CLINIC | Age: 53
End: 2023-03-13

## 2023-03-13 NOTE — TELEPHONE ENCOUNTER
Pt lmom - was seen in office about one month ago regarding menopausal symptoms  Has been without period for year and a half  Woke up this morning with cramps and bleeding

## 2023-03-13 NOTE — TELEPHONE ENCOUNTER
Called patient, patient said she woke up with a period and cramps, has not had a period in 1 5 years  Pt also said she used estrace cream for the first time yesterday, not sure if its related  Offered pt  An appt today and tomorrow in [facility]  Pt unable to make it, does not want to drive to [facility]  Pt  Would like to be seen at the [facility] office  Pt  Told there isnt any appts as of right now but if I see something open up I can give her a call back or she can call inquire about cacellations  On call provider messaged to see if there is any relation between the cream and vaginal bleeding

## 2023-03-14 NOTE — TELEPHONE ENCOUNTER
Pt scheduled for 3/21 at 830, pt said she hasn't had any more bleeding since yesterday but still feels crampy  Advised to take 600-800mg of ibuprofen an hour before procedure

## 2023-03-20 ENCOUNTER — TELEPHONE (OUTPATIENT)
Dept: OTHER | Facility: OTHER | Age: 53
End: 2023-03-20

## 2023-03-20 NOTE — TELEPHONE ENCOUNTER
Patient is calling regarding cancelling an appointment      Date/Time: 03/21/2022 @ 8:30 am with Darnelle Goodpasture, PA-C    Patient was rescheduled: YES [] NO [x]    Patient requesting call back to reschedule: YES [x] NO []

## 2023-03-28 ENCOUNTER — TELEPHONE (OUTPATIENT)
Dept: GASTROENTEROLOGY | Facility: CLINIC | Age: 53
End: 2023-03-28

## 2023-08-18 ENCOUNTER — APPOINTMENT (EMERGENCY)
Dept: CT IMAGING | Facility: HOSPITAL | Age: 53
End: 2023-08-18
Payer: COMMERCIAL

## 2023-08-18 ENCOUNTER — HOSPITAL ENCOUNTER (EMERGENCY)
Facility: HOSPITAL | Age: 53
Discharge: HOME | End: 2023-08-18
Attending: EMERGENCY MEDICINE
Payer: COMMERCIAL

## 2023-08-18 VITALS
OXYGEN SATURATION: 100 % | HEART RATE: 56 BPM | TEMPERATURE: 97.6 F | BODY MASS INDEX: 25.6 KG/M2 | RESPIRATION RATE: 16 BRPM | SYSTOLIC BLOOD PRESSURE: 123 MMHG | DIASTOLIC BLOOD PRESSURE: 57 MMHG | WEIGHT: 139.99 LBS

## 2023-08-18 DIAGNOSIS — R51.9 NONINTRACTABLE HEADACHE, UNSPECIFIED CHRONICITY PATTERN, UNSPECIFIED HEADACHE TYPE: Primary | ICD-10-CM

## 2023-08-18 LAB
ALBUMIN SERPL BCP-MCNC: 4.1 G/DL (ref 3.5–5)
ALP SERPL-CCNC: 50 U/L (ref 34–104)
ALT SERPL W P-5'-P-CCNC: 10 U/L (ref 7–52)
ANION GAP SERPL CALCULATED.3IONS-SCNC: 5 MMOL/L
AST SERPL W P-5'-P-CCNC: 13 U/L (ref 13–39)
BASOPHILS # BLD AUTO: 0.07 THOUSANDS/ÂΜL (ref 0–0.1)
BASOPHILS NFR BLD AUTO: 1 % (ref 0–1)
BILIRUB SERPL-MCNC: 0.52 MG/DL (ref 0.2–1)
BUN SERPL-MCNC: 13 MG/DL (ref 5–25)
CALCIUM SERPL-MCNC: 9.3 MG/DL (ref 8.4–10.2)
CHLORIDE SERPL-SCNC: 106 MMOL/L (ref 96–108)
CO2 SERPL-SCNC: 29 MMOL/L (ref 21–32)
CREAT SERPL-MCNC: 0.85 MG/DL (ref 0.6–1.3)
EOSINOPHIL # BLD AUTO: 0.13 THOUSAND/ÂΜL (ref 0–0.61)
EOSINOPHIL NFR BLD AUTO: 2 % (ref 0–6)
ERYTHROCYTE [DISTWIDTH] IN BLOOD BY AUTOMATED COUNT: 12.8 % (ref 11.6–15.1)
GFR SERPL CREATININE-BSD FRML MDRD: 79 ML/MIN/1.73SQ M
GLUCOSE SERPL-MCNC: 111 MG/DL (ref 65–140)
HCT VFR BLD AUTO: 35.1 % (ref 34.8–46.1)
HGB BLD-MCNC: 11.3 G/DL (ref 11.5–15.4)
IMM GRANULOCYTES # BLD AUTO: 0.03 THOUSAND/UL (ref 0–0.2)
IMM GRANULOCYTES NFR BLD AUTO: 0 % (ref 0–2)
LYMPHOCYTES # BLD AUTO: 1.78 THOUSANDS/ÂΜL (ref 0.6–4.47)
LYMPHOCYTES NFR BLD AUTO: 24 % (ref 14–44)
MCH RBC QN AUTO: 29 PG (ref 26.8–34.3)
MCHC RBC AUTO-ENTMCNC: 32.2 G/DL (ref 31.4–37.4)
MCV RBC AUTO: 90 FL (ref 82–98)
MONOCYTES # BLD AUTO: 0.36 THOUSAND/ÂΜL (ref 0.17–1.22)
MONOCYTES NFR BLD AUTO: 5 % (ref 4–12)
NEUTROPHILS # BLD AUTO: 5.14 THOUSANDS/ÂΜL (ref 1.85–7.62)
NEUTS SEG NFR BLD AUTO: 68 % (ref 43–75)
NRBC BLD AUTO-RTO: 0 /100 WBCS
PLATELET # BLD AUTO: 268 THOUSANDS/UL (ref 149–390)
PMV BLD AUTO: 11.3 FL (ref 8.9–12.7)
POTASSIUM SERPL-SCNC: 3.9 MMOL/L (ref 3.5–5.3)
PROT SERPL-MCNC: 7.4 G/DL (ref 6.4–8.4)
RBC # BLD AUTO: 3.9 MILLION/UL (ref 3.81–5.12)
SODIUM SERPL-SCNC: 140 MMOL/L (ref 135–147)
WBC # BLD AUTO: 7.51 THOUSAND/UL (ref 4.31–10.16)

## 2023-08-18 PROCEDURE — 99283 EMERGENCY DEPT VISIT LOW MDM: CPT

## 2023-08-18 PROCEDURE — 99285 EMERGENCY DEPT VISIT HI MDM: CPT | Performed by: PHYSICIAN ASSISTANT

## 2023-08-18 PROCEDURE — 85025 COMPLETE CBC W/AUTO DIFF WBC: CPT | Performed by: PHYSICIAN ASSISTANT

## 2023-08-18 PROCEDURE — 96374 THER/PROPH/DIAG INJ IV PUSH: CPT

## 2023-08-18 PROCEDURE — 80053 COMPREHEN METABOLIC PANEL: CPT | Performed by: PHYSICIAN ASSISTANT

## 2023-08-18 PROCEDURE — 70450 CT HEAD/BRAIN W/O DYE: CPT

## 2023-08-18 PROCEDURE — 96375 TX/PRO/DX INJ NEW DRUG ADDON: CPT

## 2023-08-18 PROCEDURE — G1004 CDSM NDSC: HCPCS

## 2023-08-18 PROCEDURE — 36415 COLL VENOUS BLD VENIPUNCTURE: CPT | Performed by: PHYSICIAN ASSISTANT

## 2023-08-18 RX ORDER — NAPROXEN 500 MG/1
500 TABLET ORAL 2 TIMES DAILY WITH MEALS
Qty: 30 TABLET | Refills: 0 | Status: SHIPPED | OUTPATIENT
Start: 2023-08-18

## 2023-08-18 RX ORDER — KETOROLAC TROMETHAMINE 30 MG/ML
15 INJECTION, SOLUTION INTRAMUSCULAR; INTRAVENOUS ONCE
Status: COMPLETED | OUTPATIENT
Start: 2023-08-18 | End: 2023-08-18

## 2023-08-18 RX ORDER — METOCLOPRAMIDE HYDROCHLORIDE 5 MG/ML
10 INJECTION INTRAMUSCULAR; INTRAVENOUS ONCE
Status: COMPLETED | OUTPATIENT
Start: 2023-08-18 | End: 2023-08-18

## 2023-08-18 RX ORDER — DIPHENHYDRAMINE HYDROCHLORIDE 50 MG/ML
12.5 INJECTION INTRAMUSCULAR; INTRAVENOUS ONCE
Status: COMPLETED | OUTPATIENT
Start: 2023-08-18 | End: 2023-08-18

## 2023-08-18 RX ADMIN — DIPHENHYDRAMINE HYDROCHLORIDE 12.5 MG: 50 INJECTION, SOLUTION INTRAMUSCULAR; INTRAVENOUS at 09:17

## 2023-08-18 RX ADMIN — KETOROLAC TROMETHAMINE 15 MG: 30 INJECTION, SOLUTION INTRAMUSCULAR; INTRAVENOUS at 09:17

## 2023-08-18 RX ADMIN — METOCLOPRAMIDE 10 MG: 5 INJECTION, SOLUTION INTRAMUSCULAR; INTRAVENOUS at 09:17

## 2023-08-18 NOTE — DISCHARGE INSTRUCTIONS
Please return to the emergency department for worsening symptoms including chest pain, shortness of breath, dizziness, lightheadedness, fever greater than 103, severe pain, inability to walk, fainting episodes, etc.. Please follow-up with your family practice provider as soon as possible. I have sent medications over to the pharmacy for your symptoms. Please take as directed.   Please follow-up with a headache specialist.  I have given you a referral.

## 2023-08-18 NOTE — ED PROVIDER NOTES
History  Chief Complaint   Patient presents with   • Headache     Pt reports headaches for months. This one started yesterday morning. This is a 51-year-old female presenting to the emergency department today for generalized headache. The patient notes that she has been experiencing headaches intermittently for numerous months. The patient notes for approximately 2 days she has had a worsening headache. It begins to her posterior occiput and radiates to her forehead bilaterally. The patient took Excedrin without relief. She denies any dizziness, lightheadedness, or visual disturbances. She notes mild nausea but denies any diarrhea, constipation, or vomiting. She has no abdominal pain. She denies any upper respiratory symptoms including cough, nasal congestion, rhinorrhea, and sore throat. She has no fevers or chills. She has no neck pain. She denies any photophobia or phonophobia. Headache came on gradually and is not described as a "thunderclap" headache. The patient denies other complaints at this time. History provided by:  Patient   used: No    Headache  Pain location:  Occipital  Quality:  Dull  Radiates to: forehead bilaterally.   Timing:  Constant  Progression:  Worsening  Chronicity:  New  Context: not exposure to bright light    Relieved by:  Nothing  Worsened by:  Nothing  Ineffective treatments:  NSAIDs  Associated symptoms: nausea    Associated symptoms: no abdominal pain, no back pain, no blurred vision, no congestion, no cough, no diarrhea, no dizziness, no drainage, no ear pain, no eye pain, no facial pain, no fatigue, no fever, no focal weakness, no hearing loss, no loss of balance, no myalgias, no near-syncope, no neck pain, no neck stiffness, no numbness, no paresthesias, no photophobia, no seizures, no sinus pressure, no sore throat, no swollen glands, no syncope, no tingling, no URI, no visual change, no vomiting and no weakness        Prior to Admission Medications   Prescriptions Last Dose Informant Patient Reported? Taking? PARoxetine (PAXIL) 10 mg tablet   No No   Sig: TAKE 1 TABLET BY MOUTH EVERY DAY   Patient not taking: No sig reported   dicyclomine (BENTYL) 20 mg tablet   No No   Sig: Take 1 tablet (20 mg total) by mouth 2 (two) times a day for 5 days   estradiol (ESTRACE) 0.1 mg/g vaginal cream   No No   Sig: USE A PEA SIZE AMOUNT 2-3 TIMES A WEEK ON THE VULVAR REGION.    gabapentin (Neurontin) 100 mg capsule   No No   Sig: Take 1 capsule (100 mg total) by mouth daily at bedtime   meloxicam (MOBIC) 15 mg tablet   Yes No   Sig: Take 15 mg by mouth daily   Patient not taking: Reported on 2/3/2023   naproxen (Naprosyn) 500 mg tablet   No No   Sig: Take 1 tablet (500 mg total) by mouth 2 (two) times a day with meals   Patient not taking: Reported on 2/3/2023   omeprazole (PriLOSEC) 20 mg delayed release capsule   Yes No   Sig: Take 20 mg by mouth daily before breakfast   Patient not taking: Reported on 4/10/2022   ondansetron (ZOFRAN) 4 mg tablet   No No   Sig: Take 1 tablet (4 mg total) by mouth every 8 (eight) hours as needed for nausea or vomiting for up to 5 days   ondansetron (Zofran ODT) 4 mg disintegrating tablet   No No   Sig: Take 1 tablet (4 mg total) by mouth every 6 (six) hours as needed for nausea or vomiting   Patient not taking: Reported on 2/3/2023      Facility-Administered Medications: None       Past Medical History:   Diagnosis Date   • Arthritis    • Back pain    • Cataract     right eye   • Depression    • GERD (gastroesophageal reflux disease)        Past Surgical History:   Procedure Laterality Date   • CARPAL TUNNEL RELEASE Right    • WISDOM TOOTH EXTRACTION         Family History   Problem Relation Age of Onset   • Lung cancer Maternal Uncle    • Uterine cancer Maternal Grandmother    • Ovarian cancer Maternal Grandmother    • Lymphoma Paternal Grandmother    • Hypertension Mother    • Heart disease Mother         acute   • Diabetes Father    • Hypertension Father    • Heart attack Father    • Diabetes Brother    • Hypertension Brother      I have reviewed and agree with the history as documented. E-Cigarette/Vaping   • E-Cigarette Use Current Some Day User      E-Cigarette/Vaping Substances   • Nicotine Yes    • Flavoring Yes      Social History     Tobacco Use   • Smoking status: Former     Types: Cigarettes     Quit date:      Years since quittin.6   • Smokeless tobacco: Never   • Tobacco comments:     E-cig   Vaping Use   • Vaping Use: Some days   • Substances: Nicotine, Flavoring   Substance Use Topics   • Alcohol use: Not Currently     Comment: occassionally   • Drug use: No       Review of Systems   Constitutional: Negative for appetite change, chills, diaphoresis, fatigue and fever. HENT: Negative for congestion, ear pain, hearing loss, postnasal drip, sinus pressure and sore throat. Eyes: Negative for blurred vision, photophobia, pain and visual disturbance. Respiratory: Negative for cough, chest tightness, shortness of breath and wheezing. Cardiovascular: Negative for chest pain, palpitations, leg swelling, syncope and near-syncope. Gastrointestinal: Positive for nausea. Negative for abdominal pain, constipation, diarrhea and vomiting. Musculoskeletal: Negative for back pain, myalgias, neck pain and neck stiffness. Skin: Negative for rash and wound. Neurological: Positive for headaches. Negative for dizziness, focal weakness, seizures, syncope, weakness, numbness, paresthesias and loss of balance. Psychiatric/Behavioral: Negative for confusion. All other systems reviewed and are negative. Physical Exam  Physical Exam  Vitals and nursing note reviewed. Constitutional:       General: She is not in acute distress. Appearance: Normal appearance. She is normal weight. She is not ill-appearing, toxic-appearing or diaphoretic. HENT:      Head: Normocephalic and atraumatic.       Nose: Nose normal. No congestion or rhinorrhea. Mouth/Throat:      Mouth: Mucous membranes are moist.      Pharynx: No oropharyngeal exudate or posterior oropharyngeal erythema. Eyes:      General: No scleral icterus. Right eye: No discharge. Left eye: No discharge. Extraocular Movements: Extraocular movements intact. Pupils: Pupils are equal, round, and reactive to light. Neck:      Comments: Nonmeningeal  Cardiovascular:      Rate and Rhythm: Normal rate and regular rhythm. Pulses: Normal pulses. Heart sounds: Normal heart sounds. No murmur heard. No friction rub. No gallop. Pulmonary:      Effort: Pulmonary effort is normal. No respiratory distress. Breath sounds: Normal breath sounds. No stridor. No wheezing, rhonchi or rales. Chest:      Chest wall: No tenderness. Abdominal:      General: Abdomen is flat. There is no distension. Palpations: Abdomen is soft. Tenderness: There is no abdominal tenderness. There is no right CVA tenderness, left CVA tenderness, guarding or rebound. Musculoskeletal:         General: Normal range of motion. Cervical back: Normal range of motion. No tenderness. Right lower leg: No edema. Left lower leg: No edema. Skin:     General: Skin is warm and dry. Capillary Refill: Capillary refill takes less than 2 seconds. Coloration: Skin is not jaundiced or pale. Neurological:      General: No focal deficit present. Mental Status: She is alert and oriented to person, place, and time. Mental status is at baseline.       Comments: 5/5 strength in bilateral upper and lower extremities  Normal sensation of bilateral upper and lower extremities  The patient is able to smile, frown, puff out cheeks, and raise eyebrows bilaterally symmetrically without difficulty  Normal finger-to-nose examination  No cerebellar signs are dysdiadochokinesia  Overall, a normal neurologic assessment   Psychiatric:         Mood and Affect: Mood normal.         Behavior: Behavior normal.         Vital Signs  ED Triage Vitals [08/18/23 0851]   Temperature Pulse Respirations Blood Pressure SpO2   97.6 °F (36.4 °C) 56 16 123/57 100 %      Temp Source Heart Rate Source Patient Position - Orthostatic VS BP Location FiO2 (%)   Oral Monitor -- -- --      Pain Score       9           Vitals:    08/18/23 0851   BP: 123/57   Pulse: 56         Visual Acuity      ED Medications  Medications   ketorolac (TORADOL) injection 15 mg (15 mg Intravenous Given 8/18/23 0917)   metoclopramide (REGLAN) injection 10 mg (10 mg Intravenous Given 8/18/23 0917)   diphenhydrAMINE (BENADRYL) injection 12.5 mg (12.5 mg Intravenous Given 8/18/23 0917)       Diagnostic Studies  Results Reviewed     Procedure Component Value Units Date/Time    Comprehensive metabolic panel [[id number]] Collected: 08/18/23 0918    Lab Status: Final result Specimen: Blood from Arm, Right Updated: 08/18/23 0944     Sodium 140 mmol/L      Potassium 3.9 mmol/L      Chloride 106 mmol/L      CO2 29 mmol/L      ANION GAP 5 mmol/L      BUN 13 mg/dL      Creatinine 0.85 mg/dL      Glucose 111 mg/dL      Calcium 9.3 mg/dL      AST 13 U/L      ALT 10 U/L      Alkaline Phosphatase 50 U/L      Total Protein 7.4 g/dL      Albumin 4.1 g/dL      Total Bilirubin 0.52 mg/dL      eGFR 79 ml/min/1.73sq m     Narrative:      [facility] guidelines for Chronic Kidney Disease (CKD):   •  Stage 1 with normal or high GFR (GFR > 90 mL/min/1.73 square meters)  •  Stage 2 Mild CKD (GFR = 60-89 mL/min/1.73 square meters)  •  Stage 3A Moderate CKD (GFR = 45-59 mL/min/1.73 square meters)  •  Stage 3B Moderate CKD (GFR = 30-44 mL/min/1.73 square meters)  •  Stage 4 Severe CKD (GFR = 15-29 mL/min/1.73 square meters)  •  Stage 5 End Stage CKD (GFR <15 mL/min/1.73 square meters)  Note: GFR calculation is accurate only with a steady state creatinine    CBC and differential [[id number]]  (Abnormal) Collected: 08/18/23 0918    Lab Status: Final result Specimen: Blood from Arm, Right Updated: 08/18/23 0926     WBC 7.51 Thousand/uL      RBC 3.90 Million/uL      Hemoglobin 11.3 g/dL      Hematocrit 35.1 %      MCV 90 fL      MCH 29.0 pg      MCHC 32.2 g/dL      RDW 12.8 %      MPV 11.3 fL      Platelets 591 Thousands/uL      nRBC 0 /100 WBCs      Neutrophils Relative 68 %      Immat GRANS % 0 %      Lymphocytes Relative 24 %      Monocytes Relative 5 %      Eosinophils Relative 2 %      Basophils Relative 1 %      Neutrophils Absolute 5.14 Thousands/µL      Immature Grans Absolute 0.03 Thousand/uL      Lymphocytes Absolute 1.78 Thousands/µL      Monocytes Absolute 0.36 Thousand/µL      Eosinophils Absolute 0.13 Thousand/µL      Basophils Absolute 0.07 Thousands/µL                  CT head without contrast   Final Result by Dontae Hernandez MD (08/18 6170)      No acute intracranial abnormality. Workstation performed: [id number]                    Procedures  Procedures         ED Course                               SBIRT 22yo+    Flowsheet Row Most Recent Value   Initial Alcohol Screen: US AUDIT-C     1. How often do you have a drink containing alcohol? 1 Filed at: 08/18/2023 0854   2. How many drinks containing alcohol do you have on a typical day you are drinking? 1 Filed at: 08/18/2023 0854   3b. FEMALE Any Age, or MALE 65+: How often do you have 4 or more drinks on one occassion? 0 Filed at: 08/18/2023 0854   Audit-C Score 2 Filed at: 08/18/2023 8478   PETE: How many times in the past year have you. .. Used an illegal drug or used a prescription medication for non-medical reasons? Never Filed at: 08/18/2023 5948                    Medical Decision Making  This is a 53-year-old female presenting to the emergency department today for a headache. It did not come on suddenly. Not associated with photophobia and phonophobia. It is located throughout her entire head but began at her posterior occiput. Vital signs are stable. The patient has no evidence of meningitis on physical examination. She has a reassuring neurologic assessment. CBC and CMP are reassuring. CT head negative for any acute intracranial abnormalities. Patient is feeling better after Toradol, Reglan, and Benadryl. The patient is stable for discharge at this time. Naproxen sent to the patient's pharmacy. Referral placed for neurology. Strict return precautions were given. Recommend PCP follow-up as soon as possible. The patient and/or patient's proxy verify their understanding and agree to the plan at this time. All questions answered to the patient and/or their proxy's satisfaction. All labs reviewed and utilized in the medical decision making process (if labs were ordered). Portions of the record may have been created with voice recognition software.  Occasional wrong word or "sound a like" substitutions may have occurred due to the inherent limitations of voice recognition software.  Read the chart carefully and recognize, using context, where substitutions have occurred. Nonintractable headache, unspecified chronicity pattern, unspecified headache type: complicated acute illness or injury  Amount and/or Complexity of Data Reviewed  Labs: ordered. Decision-making details documented in ED Course. Radiology: ordered. Decision-making details documented in ED Course. Risk  Prescription drug management.           Disposition  Final diagnoses:   Nonintractable headache, unspecified chronicity pattern, unspecified headache type     Time reflects when diagnosis was documented in both MDM as applicable and the Disposition within this note     Time User Action Codes Description Comment    8/18/2023  9:55 AM Amanda Garcia Add [R51.9] Nonintractable headache, unspecified chronicity pattern, unspecified headache type       ED Disposition     ED Disposition   Discharge    Condition   Stable    Date/Time   Fri Aug 18, 2023 0911 Comment   Lenoard Sujata discharge to home/self care. Follow-up Information     Follow up With Specialties Details Why Contact Info Additional [address] Emergency Department Emergency Medicine Go to  If symptoms worsen [address] [id number] [address] [facility] Emergency Department, [facility] [address] [address] Family Medicine Schedule an appointment as soon as possible for a visit   [address] [facility] [address] [phone] [address]MA[address]A [address], [address] [place], [place], [address] [phone]          Discharge Medication List as of 8/18/2023  9:57 AM      START taking these medications    Details   !! naproxen (Naprosyn) 500 mg tablet Take 1 tablet (500 mg total) by mouth 2 (two) times a day with meals, Starting Fri 8/18/2023, Normal       !! - Potential duplicate medications found. Please discuss with provider.       CONTINUE these medications which have NOT CHANGED    Details   dicyclomine (BENTYL) 20 mg tablet Take 1 tablet (20 mg total) by mouth 2 (two) times a day for 5 days, Starting Sun 2/12/2023, Until Fri 2/17/2023, Normal      estradiol (ESTRACE) 0.1 mg/g vaginal cream USE A PEA SIZE AMOUNT 2-3 TIMES A WEEK ON THE VULVAR REGION., Normal      gabapentin (Neurontin) 100 mg capsule Take 1 capsule (100 mg total) by mouth daily at bedtime, Starting Fri 2/3/2023, Until Sun 3/5/2023, Normal      meloxicam (MOBIC) 15 mg tablet Take 15 mg by mouth daily, Starting Thu 4/30/2020, Historical Med      !! naproxen (Naprosyn) 500 mg tablet Take 1 tablet (500 mg total) by mouth 2 (two) times a day with meals, Starting Tue 6/7/2022, Normal      omeprazole (PriLOSEC) 20 mg delayed release capsule Take 20 mg by mouth daily before breakfast, Starting Fri 3/27/2020, Historical Med      ondansetron (Zofran ODT) 4 mg disintegrating tablet Take 1 tablet (4 mg total) by mouth every 6 (six) hours as needed for nausea or vomiting, Starting Sun 4/10/2022, Normal      ondansetron (ZOFRAN) 4 mg tablet Take 1 tablet (4 mg total) by mouth every 8 (eight) hours as needed for nausea or vomiting for up to 5 days, Starting Sun 2/12/2023, Until Fri 2/17/2023 at 2359, Normal      PARoxetine (PAXIL) 10 mg tablet TAKE 1 TABLET BY MOUTH EVERY DAY, Normal       !! - Potential duplicate medications found. Please discuss with provider.               PDMP Review     None          ED Provider  Electronically Signed by           Margie Berger PA-C  08/18/23 7773

## 2023-09-04 ENCOUNTER — HOSPITAL ENCOUNTER (EMERGENCY)
Facility: HOSPITAL | Age: 53
Discharge: HOME | End: 2023-09-04
Attending: EMERGENCY MEDICINE
Payer: COMMERCIAL

## 2023-09-04 VITALS
RESPIRATION RATE: 16 BRPM | TEMPERATURE: 98.6 F | OXYGEN SATURATION: 98 % | HEART RATE: 88 BPM | DIASTOLIC BLOOD PRESSURE: 80 MMHG | SYSTOLIC BLOOD PRESSURE: 113 MMHG

## 2023-09-04 DIAGNOSIS — S91.112A LACERATION OF LEFT GREAT TOE: Primary | ICD-10-CM

## 2023-09-04 PROCEDURE — 76882 US LMTD JT/FCL EVL NVASC XTR: CPT | Performed by: EMERGENCY MEDICINE

## 2023-09-04 PROCEDURE — 99284 EMERGENCY DEPT VISIT MOD MDM: CPT | Performed by: EMERGENCY MEDICINE

## 2023-09-04 PROCEDURE — 12001 RPR S/N/AX/GEN/TRNK 2.5CM/<: CPT | Performed by: EMERGENCY MEDICINE

## 2023-09-04 PROCEDURE — 99282 EMERGENCY DEPT VISIT SF MDM: CPT

## 2023-09-05 NOTE — ED PROVIDER NOTES
History  Chief Complaint   Patient presents with   • Foot Laceration     Reports L foot laceration after dropping a glass bowl onto her foot. 47 y/o female presents to the ED for evaluation of left great toe laceration after she excellently dropped a glass bowl on her foot tonight. The patient states that she was home and caring several objects in her hand, including a thick glass bowl, which accidentally fell out of her arms and fell to the ground, breaking. The patient states that she walked away to clean up the broken glass but noticed blood on the floor and found a laceration to the dorsal aspect of her left great toe that was bleeding. The patient was able to control the bleeding with direct pressure and compression and came to the ER for evaluation. No other injuries or complaints. Patient denies any foreign body sensation. She notes she is up-to-date on her vaccinations/tetanus. Prior to Admission Medications   Prescriptions Last Dose Informant Patient Reported? Taking? PARoxetine (PAXIL) 10 mg tablet   No No   Sig: TAKE 1 TABLET BY MOUTH EVERY DAY   Patient not taking: No sig reported   dicyclomine (BENTYL) 20 mg tablet   No No   Sig: Take 1 tablet (20 mg total) by mouth 2 (two) times a day for 5 days   estradiol (ESTRACE) 0.1 mg/g vaginal cream   No No   Sig: USE A PEA SIZE AMOUNT 2-3 TIMES A WEEK ON THE VULVAR REGION.    gabapentin (Neurontin) 100 mg capsule   No No   Sig: Take 1 capsule (100 mg total) by mouth daily at bedtime   meloxicam (MOBIC) 15 mg tablet   Yes No   Sig: Take 15 mg by mouth daily   Patient not taking: Reported on 2/3/2023   naproxen (Naprosyn) 500 mg tablet   No No   Sig: Take 1 tablet (500 mg total) by mouth 2 (two) times a day with meals   Patient not taking: Reported on 2/3/2023   naproxen (Naprosyn) 500 mg tablet   No No   Sig: Take 1 tablet (500 mg total) by mouth 2 (two) times a day with meals   omeprazole (PriLOSEC) 20 mg delayed release capsule   Yes No Sig: Take 20 mg by mouth daily before breakfast   Patient not taking: Reported on 4/10/2022   ondansetron (ZOFRAN) 4 mg tablet   No No   Sig: Take 1 tablet (4 mg total) by mouth every 8 (eight) hours as needed for nausea or vomiting for up to 5 days   ondansetron (Zofran ODT) 4 mg disintegrating tablet   No No   Sig: Take 1 tablet (4 mg total) by mouth every 6 (six) hours as needed for nausea or vomiting   Patient not taking: Reported on 2/3/2023      Facility-Administered Medications: None       Past Medical History:   Diagnosis Date   • Arthritis    • Back pain    • Cataract     right eye   • Depression    • GERD (gastroesophageal reflux disease)        Past Surgical History:   Procedure Laterality Date   • CARPAL TUNNEL RELEASE Right    • WISDOM TOOTH EXTRACTION         Family History   Problem Relation Age of Onset   • Lung cancer Maternal Uncle    • Uterine cancer Maternal Grandmother    • Ovarian cancer Maternal Grandmother    • Lymphoma Paternal Grandmother    • Hypertension Mother    • Heart disease Mother         acute   • Diabetes Father    • Hypertension Father    • Heart attack Father    • Diabetes Brother    • Hypertension Brother      I have reviewed and agree with the history as documented. E-Cigarette/Vaping   • E-Cigarette Use Current Some Day User      E-Cigarette/Vaping Substances   • Nicotine Yes    • Flavoring Yes      Social History     Tobacco Use   • Smoking status: Former     Types: Cigarettes     Quit date:      Years since quittin.6   • Smokeless tobacco: Never   • Tobacco comments:     E-cig   Vaping Use   • Vaping Use: Some days   • Substances: Nicotine, Flavoring   Substance Use Topics   • Alcohol use: Not Currently     Comment: occassionally   • Drug use: No       Review of Systems   Constitutional: Negative for chills and fever. HENT: Negative for congestion, rhinorrhea and sore throat. Respiratory: Negative for cough and shortness of breath.     Cardiovascular: Negative for chest pain and palpitations. Gastrointestinal: Negative for abdominal pain, diarrhea, nausea and vomiting. Genitourinary: Negative for dysuria and hematuria. Musculoskeletal: Negative for back pain and neck pain. Skin: Positive for wound. Neurological: Negative for dizziness, weakness, light-headedness, numbness and headaches. All other systems reviewed and are negative. Physical Exam  Physical Exam  Vitals and nursing note reviewed. Constitutional:       General: She is not in acute distress. Appearance: Normal appearance. She is normal weight. She is not ill-appearing. HENT:      Head: Normocephalic and atraumatic. Right Ear: External ear normal.      Left Ear: External ear normal.      Nose: Nose normal. No congestion or rhinorrhea. Mouth/Throat:      Mouth: Mucous membranes are moist.      Pharynx: Oropharynx is clear. No oropharyngeal exudate or posterior oropharyngeal erythema. Eyes:      Extraocular Movements: Extraocular movements intact. Conjunctiva/sclera: Conjunctivae normal.      Pupils: Pupils are equal, round, and reactive to light. Cardiovascular:      Rate and Rhythm: Normal rate and regular rhythm. Pulses: Normal pulses. Heart sounds: Normal heart sounds. No murmur heard. Pulmonary:      Effort: Pulmonary effort is normal. No respiratory distress. Breath sounds: Normal breath sounds. No wheezing or rales. Abdominal:      General: Abdomen is flat. Bowel sounds are normal. There is no distension. Palpations: Abdomen is soft. Tenderness: There is no abdominal tenderness. There is no right CVA tenderness, left CVA tenderness or guarding. Musculoskeletal:         General: No swelling or tenderness. Normal range of motion. Cervical back: Normal range of motion and neck supple. No tenderness.       Comments: Small, linear, superficial laceration 0.7 cm and well approximated over the dorsal aspect of the left great toe, bleeding controlled. See image attached. No significant bony tenderness or deformity. Neurovascularly intact. No palpable or visible foreign bodies on examination. Skin:     General: Skin is warm and dry. Capillary Refill: Capillary refill takes less than 2 seconds. Neurological:      General: No focal deficit present. Mental Status: She is alert and oriented to person, place, and time. Sensory: No sensory deficit. Motor: No weakness. Vital Signs  ED Triage Vitals [09/04/23 2021]   Temperature Pulse Respirations Blood Pressure SpO2   98.6 °F (37 °C) 88 16 113/80 98 %      Temp src Heart Rate Source Patient Position - Orthostatic VS BP Location FiO2 (%)   -- -- Lying Right arm --      Pain Score       No Pain           Vitals:    09/04/23 2021   BP: 113/80   Pulse: 88   Patient Position - Orthostatic VS: Lying         Visual Acuity      ED Medications  Medications - No data to display    Diagnostic Studies  Results Reviewed     None                 No orders to display              Procedures  POC MSK/Soft Tissue US    Date/Time: 9/4/2023 8:34 PM    Performed by: Dom Araujo MD  Authorized by: Dom Araujo MD    Patient location:  ED  Performed by: Attending  Other Assisting Provider: No    Procedure:     Performed: soft tissue ultrasound    Procedure details:     Exam Type:  Diagnostic    Longitudinal view:  Obtained    Transverse view:  Obtained    Image quality: diagnostic      Image availability:  Images available in PACS  Soft tissue ultrasound:     Soft tissue indications: evaluation of foreign body      Anatomic location:  Lower extremity (Left great toe)    Soft tissue findings comment:  Soft tissue wound without evidence of foreign body  Interpretation:     Soft tissue impressions: no soft tissue abnormality noted    Universal Protocol:  Consent: Verbal consent obtained.   Risks and benefits: risks, benefits and alternatives were discussed  Consent given by: patient    Laceration repair    Date/Time: 9/4/2023 8:40 PM    Performed by: Reina Cheadle, MD  Authorized by: Reina Cheadle, MD  Body area: lower extremity  Location details: left big toe  Laceration length: 0.7 cm  Foreign bodies: no foreign bodies      Procedure Details:  Irrigation solution: saline  Irrigation method: syringe  Amount of cleaning: standard  Skin closure: glue  Approximation: close  Approximation difficulty: simple  Dressing: Band-Aid placed over glue after dry. Patient tolerance: patient tolerated the procedure well with no immediate complications               ED Course                               SBIRT 20yo+    Flowsheet Row Most Recent Value   Initial Alcohol Screen: US AUDIT-C     1. How often do you have a drink containing alcohol? 1 Filed at: 09/04/2023 2022   2. How many drinks containing alcohol do you have on a typical day you are drinking? 0 Filed at: 09/04/2023 2022   3b. FEMALE Any Age, or MALE 65+: How often do you have 4 or more drinks on one occassion? 0 Filed at: 09/04/2023 2022   Audit-C Score 1 Filed at: 09/04/2023 2022   PETE: How many times in the past year have you. .. Used an illegal drug or used a prescription medication for non-medical reasons? Never Filed at: 09/04/2023 2022                    Medical Decision Making  47 y/o female presents to the ED for evaluation of left great toe laceration after she excellently dropped a glass bowl on her foot tonight. The patient states that she was home and caring several objects in her hand, including a thick glass bowl, which accidentally fell out of her arms and fell to the ground, breaking. The patient states that she walked away to clean up the broken glass but noticed blood on the floor and found a laceration to the dorsal aspect of her left great toe that was bleeding. The patient was able to control the bleeding with direct pressure and compression and came to the ER for evaluation.   No other injuries or complaints. Patient denies any foreign body sensation. She notes she is up-to-date on her vaccinations/tetanus. Vital signs reviewed. Small, linear, superficial laceration 0.7 cm and well approximated over the dorsal aspect of the left great toe, bleeding controlled. See image attached. No significant bony tenderness or deformity. Neurovascularly intact. No palpable or visible foreign bodies on examination. Bedside point-of-care soft tissue ultrasound performed as documented in procedure note, no foreign bodies evident on ultrasound examination. Wound repaired with tissue adhesive as documented in procedure note, tolerated well. I discussed all findings, treatment, red flags/return precautions, and outpatient follow-up and the patient/family understand and agree. Stable for discharge. Disposition  Final diagnoses:   Laceration of left great toe     Time reflects when diagnosis was documented in both MDM as applicable and the Disposition within this note     Time User Action Codes Description Comment    9/4/2023  8:54 PM Jyl Nearing Add [U02.138W] Laceration of great toe of right foot     9/4/2023  9:03 PM Jyl Nearing Remove [I55.018M] Laceration of great toe of right foot     9/4/2023  9:03 PM Jyl Nearing Add [C11.682Y] Laceration of left great toe       ED Disposition     ED Disposition   Discharge    Condition   Stable    Date/Time   Mon Sep 4, 2023  8:54 PM    Comment   Ashlyn Yo discharge to home/self care.                Follow-up Information    None         Discharge Medication List as of 9/4/2023  8:55 PM      CONTINUE these medications which have NOT CHANGED    Details   dicyclomine (BENTYL) 20 mg tablet Take 1 tablet (20 mg total) by mouth 2 (two) times a day for 5 days, Starting Sun 2/12/2023, Until Fri 2/17/2023, Normal      estradiol (ESTRACE) 0.1 mg/g vaginal cream USE A PEA SIZE AMOUNT 2-3 TIMES A WEEK ON THE VULVAR REGION., Normal      gabapentin (Neurontin) 100 mg capsule Take 1 capsule (100 mg total) by mouth daily at bedtime, Starting Fri 2/3/2023, Until Sun 3/5/2023, Normal      meloxicam (MOBIC) 15 mg tablet Take 15 mg by mouth daily, Starting Thu 4/30/2020, Historical Med      !! naproxen (Naprosyn) 500 mg tablet Take 1 tablet (500 mg total) by mouth 2 (two) times a day with meals, Starting Tue 6/7/2022, Normal      !! naproxen (Naprosyn) 500 mg tablet Take 1 tablet (500 mg total) by mouth 2 (two) times a day with meals, Starting Fri 8/18/2023, Normal      omeprazole (PriLOSEC) 20 mg delayed release capsule Take 20 mg by mouth daily before breakfast, Starting Fri 3/27/2020, Historical Med      ondansetron (Zofran ODT) 4 mg disintegrating tablet Take 1 tablet (4 mg total) by mouth every 6 (six) hours as needed for nausea or vomiting, Starting Sun 4/10/2022, Normal      ondansetron (ZOFRAN) 4 mg tablet Take 1 tablet (4 mg total) by mouth every 8 (eight) hours as needed for nausea or vomiting for up to 5 days, Starting Sun 2/12/2023, Until Fri 2/17/2023 at 2359, Normal      PARoxetine (PAXIL) 10 mg tablet TAKE 1 TABLET BY MOUTH EVERY DAY, Normal       !! - Potential duplicate medications found. Please discuss with provider. No discharge procedures on file.     PDMP Review     None          ED Provider  Electronically Signed by           Violet Valentin MD  09/04/23 8335

## 2024-02-20 ENCOUNTER — APPOINTMENT (OUTPATIENT)
Dept: RADIOLOGY | Facility: CLINIC | Age: 54
End: 2024-02-20
Payer: OTHER MISCELLANEOUS

## 2024-02-20 ENCOUNTER — APPOINTMENT (OUTPATIENT)
Dept: URGENT CARE | Facility: CLINIC | Age: 54
End: 2024-02-20
Payer: OTHER MISCELLANEOUS

## 2024-02-20 DIAGNOSIS — S29.9XXA TRAUMATIC INJURY OF RIB: Primary | ICD-10-CM

## 2024-02-20 DIAGNOSIS — S29.9XXA TRAUMATIC INJURY OF RIB: ICD-10-CM

## 2024-02-20 PROCEDURE — 71101 X-RAY EXAM UNILAT RIBS/CHEST: CPT

## 2024-02-20 PROCEDURE — G0383 LEV 4 HOSP TYPE B ED VISIT: HCPCS | Performed by: PHYSICIAN ASSISTANT

## 2024-02-20 PROCEDURE — 99284 EMERGENCY DEPT VISIT MOD MDM: CPT | Performed by: PHYSICIAN ASSISTANT

## 2024-02-21 PROBLEM — Z01.419 ROUTINE GYNECOLOGICAL EXAMINATION: Status: RESOLVED | Noted: 2019-10-31 | Resolved: 2024-02-21

## 2024-02-23 ENCOUNTER — APPOINTMENT (OUTPATIENT)
Dept: URGENT CARE | Facility: CLINIC | Age: 54
End: 2024-02-23
Payer: OTHER MISCELLANEOUS

## 2024-02-23 PROCEDURE — 99213 OFFICE O/P EST LOW 20 MIN: CPT | Performed by: PHYSICIAN ASSISTANT

## 2024-03-05 ENCOUNTER — APPOINTMENT (OUTPATIENT)
Dept: URGENT CARE | Facility: CLINIC | Age: 54
End: 2024-03-05
Payer: OTHER MISCELLANEOUS

## 2024-03-05 PROCEDURE — 99213 OFFICE O/P EST LOW 20 MIN: CPT | Performed by: PHYSICIAN ASSISTANT

## 2024-03-12 ENCOUNTER — APPOINTMENT (OUTPATIENT)
Dept: URGENT CARE | Facility: CLINIC | Age: 54
End: 2024-03-12

## 2024-04-23 ENCOUNTER — ANNUAL EXAM (OUTPATIENT)
Dept: OBGYN CLINIC | Facility: CLINIC | Age: 54
End: 2024-04-23
Payer: COMMERCIAL

## 2024-04-23 VITALS
WEIGHT: 152 LBS | HEIGHT: 62 IN | DIASTOLIC BLOOD PRESSURE: 76 MMHG | BODY MASS INDEX: 27.97 KG/M2 | SYSTOLIC BLOOD PRESSURE: 110 MMHG

## 2024-04-23 DIAGNOSIS — Z01.419 ROUTINE GYNECOLOGICAL EXAMINATION: Primary | ICD-10-CM

## 2024-04-23 DIAGNOSIS — Z13.220 SCREENING FOR LIPID DISORDERS: ICD-10-CM

## 2024-04-23 DIAGNOSIS — F41.9 ANXIETY: ICD-10-CM

## 2024-04-23 DIAGNOSIS — Z78.0 MENOPAUSE: ICD-10-CM

## 2024-04-23 DIAGNOSIS — Z12.31 ENCOUNTER FOR SCREENING MAMMOGRAM FOR MALIGNANT NEOPLASM OF BREAST: ICD-10-CM

## 2024-04-23 DIAGNOSIS — Z12.11 COLON CANCER SCREENING: ICD-10-CM

## 2024-04-23 PROCEDURE — 99396 PREV VISIT EST AGE 40-64: CPT | Performed by: PHYSICIAN ASSISTANT

## 2024-04-23 PROCEDURE — G0145 SCR C/V CYTO,THINLAYER,RESCR: HCPCS | Performed by: PHYSICIAN ASSISTANT

## 2024-04-23 PROCEDURE — G0476 HPV COMBO ASSAY CA SCREEN: HCPCS | Performed by: PHYSICIAN ASSISTANT

## 2024-04-23 NOTE — PROGRESS NOTES
Assessment/Plan   Problem List Items Addressed This Visit    None  Visit Diagnoses       Routine gynecological examination    -  Primary    Relevant Orders    Liquid-based pap, screening    Anxiety        Relevant Orders    TSH, 3rd generation    T4, free    CBC and differential    Comprehensive metabolic panel    Ambulatory Referral to Obstetrics / Gynecology    Screening for lipid disorders        Relevant Orders    Lipid panel    Encounter for screening mammogram for malignant neoplasm of breast        Relevant Orders    Mammo screening bilateral w 3d & cad    Colon cancer screening        Relevant Orders    Cologuard    Menopause        Relevant Medications    sertraline (Zoloft) 50 mg tablet    Other Relevant Orders    Ambulatory Referral to Obstetrics / Gynecology            Discussion    All questions have been answered to her satisfaction  -Pt has follow up planned w new PCP in approx 2 weeks for evaluation. She will plan work up for memory loss at that point as well as to establish for routine care.   -Pap done today.   -Mammo ordered.   -Labs ordered given no recent screening done as well as anxiety sx. Aware if abnormal lipids/TFTs, etc would need to plan management w PCP.   -We did review her episodes that sound c/w panic attacks which may be precipitated by her hot flashes.   -Advised trial of coconut oil as needed for vaginal moisture.   -We reviewed tx options for her menopausal sx. Given depression and anxiety component as well, SSRI likely best option to try to minimize her sx. She did not feel well previously on Paxil and d/c'd after 2 days. Will plan Zoloft start up. We reviewed usage, risks and benefits. Will take 1/2 tab daily x 7 days then increase to 1 tab daily.   -Pt will also plan to establish with Dr Kilpatrick. If sx improved w Zoloft will plan follow up here w us in approx 6-8 weeks. If sx have not improved will plan care with Dr GRAVES for additional recommendations for menopausal sx.        Subjective     HPI   Gerda Mehta is a 54 y.o. female who presents for annual well woman exam.     LMP - 2021 ; She went a little over a year without a period then last winter when she was under a large amount of stress she got a period again.She was supposed to have work up done but did not have insurance so she did not. She denies any recurrent VB since. Encouraged to call with any recurrence. We did review concerns for  bleeding but given proximity to last menstrual cycle as well as no recurrence since in > 1 year, will watch for now.     No concerning breast masses, asymmetry, nipple discharge or bleeding, changes in skin of breast, or breast tenderness bilaterally.     No abd/pelvic pain or HAs;     Pt notes that she has frequent hot flashes associated with anxiety. Notes this will happen 10 times per day. She feels SOB when these episodes occur although she notes this has been more so since covid. She also feels like her chest gets tight and she becomes very anxious about the sx she is experiencing at that moment.   She notes increased changes in memory and often forgetting things. Her family has concerns for dementia.   She also notes that she feels emotionally numb some days.   Was also seen in ED for headaches and tole tension.     Pt is not sexually active often due to libido, but in a mutually monog/ relationship;  She declines sti/hiv/hep testing; Feels safe at home      (+) PCP for routine Bw/care;    Last Pap : 10/13/2019 WNL neg HPV   History of abnormal Pap smear: denies   Mammo:4/6/18 diagnostic mammo-[facility] 1   Abnormal mammo: denies   Colonoscopy:cologuard ordered, declines colonoscopy     Review of Systems   Constitutional: Negative.    Respiratory:  Positive for shortness of breath.    Gastrointestinal: Negative.    Endocrine: Positive for heat intolerance and polydipsia.   Genitourinary: Negative.  Negative for menstrual problem.   Psychiatric/Behavioral:  Positive for  agitation, decreased concentration and dysphoric mood. The patient is nervous/anxious.        The following portions of the patient's history were reviewed and updated as appropriate: allergies, current medications, past family history, past medical history, past social history, past surgical history, and problem list.         OB History          3    Para   2    Term   2            AB   1    Living   2         SAB        IAB        Ectopic        Multiple        Live Births   2                 Past Medical History:   Diagnosis Date    Arthritis     Back pain     Cataract     right eye    Depression     GERD (gastroesophageal reflux disease)        Past Surgical History:   Procedure Laterality Date    CARPAL TUNNEL RELEASE Right     WISDOM TOOTH EXTRACTION         Family History   Problem Relation Age of Onset    Lung cancer Maternal Uncle     Uterine cancer Maternal Grandmother     Ovarian cancer Maternal Grandmother     Lymphoma Paternal Grandmother     Hypertension Mother     Heart disease Mother         acute    Diabetes Father     Hypertension Father     Heart attack Father     Stroke Father     Diabetes Brother     Hypertension Brother        Social History     Socioeconomic History    Marital status: Single     Spouse name: Not on file    Number of children: Not on file    Years of education: Not on file    Highest education level: Not on file   Occupational History    Occupation:    Tobacco Use    Smoking status: Former     Current packs/day: 0.00     Types: Cigarettes     Quit date:      Years since quitting: 10.3    Smokeless tobacco: Never    Tobacco comments:     E-cig   Vaping Use    Vaping status: Some Days    Substances: Nicotine, Flavoring   Substance and Sexual Activity    Alcohol use: Not Currently    Drug use: No    Sexual activity: Not Currently     Partners: Male   Other Topics Concern    Not on file   Social History Narrative    · Do you currently or have you  "served in the [employer]:   No      · Caffeine intake:   None      · Guns present in home:   No      · Seat belts used routinely:   Yes      · Sunscreen used routinely:   Yes      · Smoke alarm in home:   Yes      · Advance directive:   No      ·      Social Determinants of Health     Financial Resource Strain: Not on file   Food Insecurity: Not on file   Transportation Needs: Not on file   Physical Activity: Not on file   Stress: Not on file   Social Connections: Not on file   Intimate Partner Violence: Not on file   Housing Stability: Not on file         Current Outpatient Medications:     sertraline (Zoloft) 50 mg tablet, 1/2 tablet on e daily x 7 days, then one tablet daily therafter, Disp: 30 tablet, Rfl: 2    estradiol (ESTRACE) 0.1 mg/g vaginal cream, USE A PEA SIZE AMOUNT 2-3 TIMES A WEEK ON THE VULVAR REGION. (Patient not taking: Reported on 4/23/2024), Disp: 42.5 g, Rfl: 0    gabapentin (Neurontin) 100 mg capsule, Take 1 capsule (100 mg total) by mouth daily at bedtime (Patient not taking: Reported on 4/23/2024), Disp: 30 capsule, Rfl: 2    naproxen (Naprosyn) 500 mg tablet, Take 1 tablet (500 mg total) by mouth 2 (two) times a day with meals (Patient not taking: Reported on 4/23/2024), Disp: 30 tablet, Rfl: 0    No Known Allergies    Objective   Vitals:    04/23/24 0905   BP: 110/76   BP Location: Left arm   Patient Position: Sitting   Cuff Size: Standard   Weight: 68.9 kg (152 lb)   Height: 5' 2\" (1.575 m)     Physical Exam  Vitals reviewed.   HENT:      Head: Normocephalic and atraumatic.   Cardiovascular:      Rate and Rhythm: Normal rate and regular rhythm.   Pulmonary:      Effort: Pulmonary effort is normal.      Breath sounds: Normal breath sounds.   Chest:   Breasts:     Breasts are symmetrical.      Right: No swelling, bleeding, inverted nipple, mass, nipple discharge, skin change or tenderness.      Left: No swelling, bleeding, inverted nipple, mass, nipple discharge, skin change or " tenderness.   Abdominal:      General: Abdomen is flat. Bowel sounds are normal.      Palpations: Abdomen is soft.      Tenderness: There is no abdominal tenderness. There is no right CVA tenderness, left CVA tenderness or guarding.   Genitourinary:     General: Normal vulva.      Pubic Area: No rash.       Labia:         Right: No rash, tenderness, lesion or injury.         Left: No rash, tenderness, lesion or injury.       Urethra: No prolapse, urethral pain, urethral swelling or urethral lesion.      Vagina: Normal. No signs of injury and foreign body. No vaginal discharge or erythema.      Cervix: Normal.      Uterus: Normal.       Adnexa: Right adnexa normal and left adnexa normal.      Comments: Some vaginal atrophy noted on exam.   Musculoskeletal:      Cervical back: Neck supple.   Lymphadenopathy:      Upper Body:      Right upper body: No axillary adenopathy.      Left upper body: No axillary adenopathy.   Skin:     General: Skin is warm and dry.   Neurological:      Mental Status: She is alert and oriented to person, place, and time.   Psychiatric:         Mood and Affect: Mood normal.         Behavior: Behavior normal.         Thought Content: Thought content normal.         Judgment: Judgment normal.         There are no Patient Instructions on file for this visit.

## 2024-04-26 ENCOUNTER — NURSE TRIAGE (OUTPATIENT)
Age: 54
End: 2024-04-26

## 2024-04-26 NOTE — TELEPHONE ENCOUNTER
"Patient called stating she was started on Zoloft for postmenopausal symptoms. She was prescribed 50 mg and took 1/2 tab last night as ordered. Patient started with nausea and diarrhea. States feeling mild SOB, denies CP. States used inhaler this AM with minimal effect. Patient also took TUMS. Advised patient to stop medication. Advised to proceed to ER if increased SOB,CP. Message routed to clinical pool.    Reason for Disposition   Caller has URGENT medicine question about med that PCP or specialist prescribed and triager unable to answer question    Answer Assessment - Initial Assessment Questions  1. NAME of MEDICATION: \"What medicine are you calling about?\"      Zoloft    3. PRESCRIBING HCP: \"Who prescribed it?\" Reason: if prescribed by specialist, call should be referred to that group.      Lydia Tapia  4. SYMPTOMS: \"Do you have any symptoms?\"      Nausea, diarrhea, mild SOB    6. PREGNANCY:  \"Is there any chance that you are pregnant?\" \"When was your last menstrual period?\"      Menopausal    Protocols used: Medication Question Call-ADULT-[place]    "

## 2024-04-30 LAB
LAB AP GYN PRIMARY INTERPRETATION: NORMAL
Lab: NORMAL

## 2024-05-06 ENCOUNTER — APPOINTMENT (OUTPATIENT)
Dept: LAB | Facility: HOSPITAL | Age: 54
End: 2024-05-06
Payer: COMMERCIAL

## 2024-05-06 DIAGNOSIS — R53.82 CHRONIC FATIGUE: ICD-10-CM

## 2024-05-06 DIAGNOSIS — Z13.220 SCREENING FOR LIPID DISORDERS: ICD-10-CM

## 2024-05-06 DIAGNOSIS — Z11.4 SCREENING FOR HUMAN IMMUNODEFICIENCY VIRUS: ICD-10-CM

## 2024-05-06 DIAGNOSIS — Z11.59 SCREENING EXAMINATION FOR POLIOMYELITIS: ICD-10-CM

## 2024-05-06 DIAGNOSIS — Z13.6 SCREENING FOR ISCHEMIC HEART DISEASE: ICD-10-CM

## 2024-05-06 DIAGNOSIS — F41.9 ANXIETY: ICD-10-CM

## 2024-05-06 DIAGNOSIS — K59.00 CONSTIPATION, UNSPECIFIED CONSTIPATION TYPE: ICD-10-CM

## 2024-05-06 DIAGNOSIS — Z00.00 ROUTINE GENERAL MEDICAL EXAMINATION AT A HEALTH CARE FACILITY: ICD-10-CM

## 2024-05-06 LAB
ALBUMIN SERPL BCP-MCNC: 4.2 G/DL (ref 3.5–5)
ALP SERPL-CCNC: 59 U/L (ref 34–104)
ALT SERPL W P-5'-P-CCNC: 8 U/L (ref 7–52)
ANION GAP SERPL CALCULATED.3IONS-SCNC: 7 MMOL/L (ref 4–13)
AST SERPL W P-5'-P-CCNC: 12 U/L (ref 13–39)
BASOPHILS # BLD AUTO: 0.05 THOUSANDS/ÂΜL (ref 0–0.1)
BASOPHILS NFR BLD AUTO: 1 % (ref 0–1)
BILIRUB SERPL-MCNC: 0.7 MG/DL (ref 0.2–1)
BUN SERPL-MCNC: 18 MG/DL (ref 5–25)
CALCIUM SERPL-MCNC: 9.7 MG/DL (ref 8.4–10.2)
CHLORIDE SERPL-SCNC: 103 MMOL/L (ref 96–108)
CHOLEST SERPL-MCNC: 157 MG/DL
CO2 SERPL-SCNC: 30 MMOL/L (ref 21–32)
CREAT SERPL-MCNC: 0.87 MG/DL (ref 0.6–1.3)
EOSINOPHIL # BLD AUTO: 0.2 THOUSAND/ÂΜL (ref 0–0.61)
EOSINOPHIL NFR BLD AUTO: 3 % (ref 0–6)
ERYTHROCYTE [DISTWIDTH] IN BLOOD BY AUTOMATED COUNT: 12.7 % (ref 11.6–15.1)
FERRITIN SERPL-MCNC: 45 NG/ML (ref 11–307)
GFR SERPL CREATININE-BSD FRML MDRD: 76 ML/MIN/1.73SQ M
GLUCOSE P FAST SERPL-MCNC: 88 MG/DL (ref 65–99)
HCT VFR BLD AUTO: 35.4 % (ref 34.8–46.1)
HCV AB SER QL: NORMAL
HDLC SERPL-MCNC: 42 MG/DL
HGB BLD-MCNC: 11.4 G/DL (ref 11.5–15.4)
HIV 1+2 AB+HIV1 P24 AG SERPL QL IA: NORMAL
HIV 2 AB SERPL QL IA: NORMAL
HIV1 AB SERPL QL IA: NORMAL
HIV1 P24 AG SERPL QL IA: NORMAL
IMM GRANULOCYTES # BLD AUTO: 0.02 THOUSAND/UL (ref 0–0.2)
IMM GRANULOCYTES NFR BLD AUTO: 0 % (ref 0–2)
LDLC SERPL CALC-MCNC: 102 MG/DL (ref 0–100)
LYMPHOCYTES # BLD AUTO: 1.77 THOUSANDS/ÂΜL (ref 0.6–4.47)
LYMPHOCYTES NFR BLD AUTO: 28 % (ref 14–44)
MCH RBC QN AUTO: 28.7 PG (ref 26.8–34.3)
MCHC RBC AUTO-ENTMCNC: 32.2 G/DL (ref 31.4–37.4)
MCV RBC AUTO: 89 FL (ref 82–98)
MONOCYTES # BLD AUTO: 0.62 THOUSAND/ÂΜL (ref 0.17–1.22)
MONOCYTES NFR BLD AUTO: 10 % (ref 4–12)
NEUTROPHILS # BLD AUTO: 3.57 THOUSANDS/ÂΜL (ref 1.85–7.62)
NEUTS SEG NFR BLD AUTO: 58 % (ref 43–75)
NONHDLC SERPL-MCNC: 115 MG/DL
NRBC BLD AUTO-RTO: 0 /100 WBCS
PLATELET # BLD AUTO: 255 THOUSANDS/UL (ref 149–390)
PMV BLD AUTO: 11.6 FL (ref 8.9–12.7)
POTASSIUM SERPL-SCNC: 4.1 MMOL/L (ref 3.5–5.3)
PROT SERPL-MCNC: 7.4 G/DL (ref 6.4–8.4)
RBC # BLD AUTO: 3.97 MILLION/UL (ref 3.81–5.12)
SODIUM SERPL-SCNC: 140 MMOL/L (ref 135–147)
T4 FREE SERPL-MCNC: 0.66 NG/DL (ref 0.61–1.12)
TRIGL SERPL-MCNC: 66 MG/DL
TSH SERPL DL<=0.05 MIU/L-ACNC: 1.19 UIU/ML (ref 0.45–4.5)
WBC # BLD AUTO: 6.23 THOUSAND/UL (ref 4.31–10.16)

## 2024-05-06 PROCEDURE — 84443 ASSAY THYROID STIM HORMONE: CPT

## 2024-05-06 PROCEDURE — 36415 COLL VENOUS BLD VENIPUNCTURE: CPT

## 2024-05-06 PROCEDURE — 84439 ASSAY OF FREE THYROXINE: CPT

## 2024-05-06 PROCEDURE — 80061 LIPID PANEL: CPT

## 2024-05-06 PROCEDURE — 82728 ASSAY OF FERRITIN: CPT

## 2024-05-06 PROCEDURE — 85025 COMPLETE CBC W/AUTO DIFF WBC: CPT

## 2024-05-06 PROCEDURE — 80053 COMPREHEN METABOLIC PANEL: CPT

## 2024-05-06 PROCEDURE — 86803 HEPATITIS C AB TEST: CPT

## 2024-05-06 PROCEDURE — 87389 HIV-1 AG W/HIV-1&-2 AB AG IA: CPT

## 2024-05-21 ENCOUNTER — TELEPHONE (OUTPATIENT)
Age: 54
End: 2024-05-21

## 2024-05-21 LAB — COLOGUARD RESULT REPORTABLE: NEGATIVE

## 2024-05-21 NOTE — TELEPHONE ENCOUNTER
----- Message from Lydia Tapia PA-C sent at 5/21/2024  7:18 AM EDT -----  Negative cologuard-will be due for repeat in 3 years.   Please make pt aware, no SCHEDithart access.   Thanks!

## 2024-05-21 NOTE — TELEPHONE ENCOUNTER
Called patient reviewed results and note from provider. Patient verbalized understanding. No further questions at this time.

## 2024-05-28 ENCOUNTER — TELEPHONE (OUTPATIENT)
Age: 54
End: 2024-05-28

## 2024-05-28 NOTE — TELEPHONE ENCOUNTER
Caller: Patient    Doctor: rian    Reason for call: Requested fax# for [facility] Provided    Call back#: [phone]

## 2024-06-12 ENCOUNTER — OFFICE VISIT (OUTPATIENT)
Dept: OBGYN CLINIC | Facility: CLINIC | Age: 54
End: 2024-06-12
Payer: COMMERCIAL

## 2024-06-12 VITALS
HEART RATE: 67 BPM | WEIGHT: 152 LBS | BODY MASS INDEX: 27.97 KG/M2 | RESPIRATION RATE: 17 BRPM | DIASTOLIC BLOOD PRESSURE: 71 MMHG | SYSTOLIC BLOOD PRESSURE: 111 MMHG | HEIGHT: 62 IN

## 2024-06-12 DIAGNOSIS — M17.10 PATELLOFEMORAL ARTHRITIS: Primary | ICD-10-CM

## 2024-06-12 PROCEDURE — 99213 OFFICE O/P EST LOW 20 MIN: CPT | Performed by: STUDENT IN AN ORGANIZED HEALTH CARE EDUCATION/TRAINING PROGRAM

## 2024-06-12 PROCEDURE — 20610 DRAIN/INJ JOINT/BURSA W/O US: CPT

## 2024-06-12 RX ORDER — BUPIVACAINE HYDROCHLORIDE 2.5 MG/ML
4 INJECTION, SOLUTION INFILTRATION; PERINEURAL
Status: COMPLETED | OUTPATIENT
Start: 2024-06-12 | End: 2024-06-12

## 2024-06-12 RX ORDER — TRIAMCINOLONE ACETONIDE 40 MG/ML
40 INJECTION, SUSPENSION INTRA-ARTICULAR; INTRAMUSCULAR
Status: COMPLETED | OUTPATIENT
Start: 2024-06-12 | End: 2024-06-12

## 2024-06-12 RX ADMIN — BUPIVACAINE HYDROCHLORIDE 4 ML: 2.5 INJECTION, SOLUTION INFILTRATION; PERINEURAL at 10:30

## 2024-06-12 RX ADMIN — TRIAMCINOLONE ACETONIDE 40 MG: 40 INJECTION, SUSPENSION INTRA-ARTICULAR; INTRAMUSCULAR at 10:30

## 2024-06-12 NOTE — PROGRESS NOTES
Ortho Sports Medicine Knee New Patient Visit     Assesment:   54 y.o. female bilateral knee mild osteoarthritis    Plan:    Gerda is present in office for evaluation of bilateral knee pain. X-rays from 2023 were reviewed in office, discussed that she has bilateral mild osteoarthritis. At this time recommended physical therapy and bilateral corticosteroid injection, this has helped patient significantly in the past. Bilateral knee corticosteroid injections were placed without complaints and well tolerated. Discussed that she can use ice, ibuprofen and tylenol as needed. She is to follow up as needed.    Conservative treatment:    Ice to knee for 20 minutes at least 1-2 times daily.  PT for ROM/strengthening to knee, hip and core.  OTC NSAIDS prn for pain.  Tylenol for pain.    Imaging:    All imaging from today was reviewed by myself and explained to the patient.       Injection:    The risks and benefits of the injection (which include but are not limited to: infection, bleeding,damage to nerve/artery, need for further intervention), as well as the risks and benefits of all alternative treatments were explained and understood.  The patient elected to proceed with injection.  The procedure was done with aseptic technique, and the patient tolerated the procedure well with no complications.   Bilateral knee corticosteroid injection was performed.      Surgery:     No surgery is recommended at this point, continue with conservative treatment plan as noted.      Follow up:    No follow-ups on file.        Chief Complaint   Patient presents with    Left Elbow - Pain    Right Elbow - Pain       History of Present Illness:    The patient is a 54 y.o. female whose occupation is unemployed, seen in clinic for evaluation of bilateral  knee pain. Patient reports that her knees have been bothering her for a couple years now and have gotten worse for the past couple of months. States that in last January she was given  corticosteroid injections that significantly improved. States that her pain is dull bilaterally, right knee is worse and having pain at the quadriceps tendon. States that she has been using mineral ice and OTC pain medications. Denies any injury and denies any numbness or tingling.     Knee Surgical History:  None    Past Medical, Social and Family History:  Past Medical History:   Diagnosis Date    Arthritis     Back pain     Cataract     right eye    Depression     GERD (gastroesophageal reflux disease)      Past Surgical History:   Procedure Laterality Date    CARPAL TUNNEL RELEASE Right     WISDOM TOOTH EXTRACTION       Allergies   Allergen Reactions    Percocet [Oxycodone-Acetaminophen] GI Intolerance     Current Outpatient Medications on File Prior to Visit   Medication Sig Dispense Refill    sertraline (Zoloft) 50 mg tablet 1/2 tablet on e daily x 7 days, then one tablet daily therafter 30 tablet 2    estradiol (ESTRACE) 0.1 mg/g vaginal cream USE A PEA SIZE AMOUNT 2-3 TIMES A WEEK ON THE VULVAR REGION. (Patient not taking: Reported on 4/23/2024) 42.5 g 0    gabapentin (Neurontin) 100 mg capsule Take 1 capsule (100 mg total) by mouth daily at bedtime (Patient not taking: Reported on 4/23/2024) 30 capsule 2    naproxen (Naprosyn) 500 mg tablet Take 1 tablet (500 mg total) by mouth 2 (two) times a day with meals (Patient not taking: Reported on 4/23/2024) 30 tablet 0     No current facility-administered medications on file prior to visit.     Social History     Socioeconomic History    Marital status: Single     Spouse name: Not on file    Number of children: Not on file    Years of education: Not on file    Highest education level: Not on file   Occupational History    Occupation:    Tobacco Use    Smoking status: Former     Current packs/day: 0.00     Types: Cigarettes     Quit date: 2014     Years since quitting: 10.4    Smokeless tobacco: Never    Tobacco comments:     E-cig   Vaping Use     Vaping status: Some Days    Substances: Nicotine, Flavoring   Substance and Sexual Activity    Alcohol use: Not Currently    Drug use: No    Sexual activity: Not Currently     Partners: Male   Other Topics Concern    Not on file   Social History Narrative    · Do you currently or have you served in the [employer]:   No      · Caffeine intake:   None      · Guns present in home:   No      · Seat belts used routinely:   Yes      · Sunscreen used routinely:   Yes      · Smoke alarm in home:   Yes      · Advance directive:   No      ·      Social Determinants of Health     Financial Resource Strain: Medium Risk (5/1/2024)    [facility]    Overall Financial Resource Strain (CARDIA)     Difficulty of Paying Living Expenses: Somewhat hard   Food Insecurity: Food Insecurity Present (5/1/2024)    [facility]    Hunger Vital Sign     Worried About Running Out of Food in the Last Year: Sometimes true     Ran Out of Food in the Last Year: Sometimes true   Transportation Needs: No Transportation Needs (5/1/2024)    [facility], [facility]    PRAPARE - Transportation     Lack of Transportation (Medical): No     Lack of Transportation (Non-Medical): No   Physical Activity: Not on file   Stress: Stress Concern Present (5/1/2024)    [facility] from [facility], [facility]    [employer] [place] [employer] - Occupational Stress Questionnaire     Feeling of Stress : Very much   Social Connections: Feeling Somewhat Isolated (5/1/2024)    [facility] from [facility], [facility]    OASIS : Social Isolation     How often do you feel lonely or isolated from those around you?: Sometimes   Intimate Partner Violence: Not At Risk (5/1/2024)    Received from [facility],  "[facility]    Humiliation, Afraid, Rape, and Kick questionnaire     Fear of Current or Ex-Partner: No     Emotionally Abused: No     Physically Abused: No     Sexually Abused: No   Housing Stability: Unknown (5/1/2024)    [facility], [facility]    Housing Stability Vital Sign     Unable to Pay for Housing in the Last Year: No     Number of Times Moved in the Last Year: Not on file     Homeless in the Last Year: No         I have reviewed the past medical, surgical, social and family history, medications and allergies as documented in the EMR.    Review of systems: ROS is negative other than that noted in the HPI.  Constitutional: Negative for fatigue and fever.   HENT: Negative for sore throat.    Respiratory: Negative for shortness of breath.    Cardiovascular: Negative for chest pain.   Gastrointestinal: Negative for abdominal pain.   Endocrine: Negative for cold intolerance and heat intolerance.   Genitourinary: Negative for flank pain.   Musculoskeletal: Negative for back pain.   Skin: Negative for rash.   Allergic/Immunologic: Negative for immunocompromised state.   Neurological: Negative for dizziness.   Psychiatric/Behavioral: Negative for agitation.      Physical Exam:    Blood pressure 111/71, pulse 67, resp. rate 17, height 5' 2\" (1.575 m), weight 68.9 kg (152 lb).    General/Constitutional: NAD, well developed, well nourished  HENT: Normocephalic, atraumatic  CV: Intact distal pulses, regular rate  Resp: No respiratory distress or labored breathing  Lymphatic: No lymphadenopathy palpated  Neuro: Alert and Oriented x 3, no focal deficits  Psych: Normal mood, normal affect, normal judgement, normal behavior  Skin: Warm, dry, no rashes, no erythema      Knee Exam (focused):  Visual inspection of the bilateral knee demonstrates normal contour without atrophy.   No previous incisions   There is no significant erythema or edema.    No " "significant joint effusion   Range of motion is full from 0-130 degrees of flexion   Able to straight leg raise   mild tender to palpation of right quad tendon  + medial joint line tenderness, - lateral joint line tenderness Right knee  - medial joint line tenderness, - lateral joint line tenderness Left knee  - medial Amena's, - lateral Amena's  1A Lachman exam, stable posterior drawer  Stable to varus and valgus stress at both 0 and 30°  Patella tracks normally.  No J sign.  No apprehension.  Translation is approximately 2 quadrants and is equal to the contralateral side  Patellar eversion is similar to the contralateral side    Examination of the patient's ipsilateral hip demonstrates full painless range of motion.  No crepitus.      LE NV Exam: +2 DP/PT pulses bilaterally  Sensation intact to light touch L2-S1 bilaterally     Bilateral hip ROM demonstrates no pain actively or passively    No calf tenderness to palpation bilaterally    Knee Imaging    X-rays of the bilateral knee were reviewed, which demonstrate mild osteoarthritis.  I have reviewed the radiology report and agree with their impression.      Large joint arthrocentesis: bilateral knee  Universal Protocol:  Consent: Verbal consent obtained. Written consent not obtained.  Risks and benefits: risks, benefits and alternatives were discussed  Consent given by: patient  Time out: Immediately prior to procedure a \"time out\" was called to verify the correct patient, procedure, equipment, support staff and site/side marked as required.  Timeout called at: 6/12/2024 11:56 AM.  Patient understanding: patient states understanding of the procedure being performed  Relevant documents: relevant documents present and verified  Test results: test results available and properly labeled  Site marked: the operative site was marked  Radiology Images displayed and confirmed. If images not available, report reviewed: imaging studies available  Patient identity " confirmed: verbally with patient, provided demographic data and hospital-assigned identification number  Supporting Documentation  Indications: pain and joint swelling   Procedure Details  Location: knee - bilateral knee  Preparation: Patient was prepped and draped in the usual sterile fashion  Needle size: 18 G  Ultrasound guidance: no  Approach: anterolateral    Medications (Right): 4 mL bupivacaine 0.25 %; 40 mg triamcinolone acetonide 40 mg/mLMedications (Left): 4 mL bupivacaine 0.25 %; 40 mg triamcinolone acetonide 40 mg/mL   Patient tolerance: patient tolerated the procedure well with no immediate complications  Dressing:  Sterile dressing applied            Scribe Attestation      I,:  Carisa Curiel PA-C am acting as a scribe while in the presence of the attending physician.:       I,:  Endy Jacobsen DO personally performed the services described in this documentation    as scribed in my presence.:

## 2024-07-24 DIAGNOSIS — Z78.0 MENOPAUSE: ICD-10-CM

## 2024-07-24 NOTE — TELEPHONE ENCOUNTER
Reason for call:   [x] Refill   [] Prior Auth  [] Other:     Office:   [] PCP/Provider -   [x] Specialty/Provider - [facility] / Lydia Tapia PA-C     Medication:       Does the patient have enough for 3 days?   [] Yes   [x] No - Send as HP to POD

## 2024-09-26 ENCOUNTER — APPOINTMENT (EMERGENCY)
Dept: CT IMAGING | Facility: HOSPITAL | Age: 54
End: 2024-09-26
Payer: COMMERCIAL

## 2024-09-26 ENCOUNTER — HOSPITAL ENCOUNTER (EMERGENCY)
Facility: HOSPITAL | Age: 54
Discharge: HOME | End: 2024-09-26
Attending: EMERGENCY MEDICINE | Admitting: EMERGENCY MEDICINE
Payer: COMMERCIAL

## 2024-09-26 ENCOUNTER — APPOINTMENT (EMERGENCY)
Dept: RADIOLOGY | Facility: HOSPITAL | Age: 54
End: 2024-09-26
Payer: COMMERCIAL

## 2024-09-26 VITALS
SYSTOLIC BLOOD PRESSURE: 101 MMHG | OXYGEN SATURATION: 100 % | HEIGHT: 62 IN | RESPIRATION RATE: 15 BRPM | TEMPERATURE: 98.2 F | BODY MASS INDEX: 27.8 KG/M2 | HEART RATE: 57 BPM | DIASTOLIC BLOOD PRESSURE: 70 MMHG

## 2024-09-26 DIAGNOSIS — K44.9 HIATAL HERNIA: ICD-10-CM

## 2024-09-26 DIAGNOSIS — R10.13 EPIGASTRIC ABDOMINAL PAIN: Primary | ICD-10-CM

## 2024-09-26 LAB
ALBUMIN SERPL BCG-MCNC: 4.4 G/DL (ref 3.5–5)
ALP SERPL-CCNC: 63 U/L (ref 34–104)
ALT SERPL W P-5'-P-CCNC: 10 U/L (ref 7–52)
ANION GAP SERPL CALCULATED.3IONS-SCNC: 9 MMOL/L (ref 4–13)
AST SERPL W P-5'-P-CCNC: 13 U/L (ref 13–39)
BASOPHILS # BLD AUTO: 0.07 THOUSANDS/ΜL (ref 0–0.1)
BASOPHILS NFR BLD AUTO: 1 % (ref 0–1)
BILIRUB SERPL-MCNC: 0.39 MG/DL (ref 0.2–1)
BUN SERPL-MCNC: 11 MG/DL (ref 5–25)
CALCIUM SERPL-MCNC: 9.5 MG/DL (ref 8.4–10.2)
CARDIAC TROPONIN I PNL SERPL HS: <2 NG/L
CHLORIDE SERPL-SCNC: 101 MMOL/L (ref 96–108)
CO2 SERPL-SCNC: 30 MMOL/L (ref 21–32)
CREAT SERPL-MCNC: 0.99 MG/DL (ref 0.6–1.3)
EOSINOPHIL # BLD AUTO: 0.16 THOUSAND/ΜL (ref 0–0.61)
EOSINOPHIL NFR BLD AUTO: 2 % (ref 0–6)
ERYTHROCYTE [DISTWIDTH] IN BLOOD BY AUTOMATED COUNT: 13.2 % (ref 11.6–15.1)
GFR SERPL CREATININE-BSD FRML MDRD: 65 ML/MIN/1.73SQ M
GLUCOSE SERPL-MCNC: 90 MG/DL (ref 65–140)
HCG SERPL QL: NEGATIVE
HCT VFR BLD AUTO: 38.5 % (ref 34.8–46.1)
HGB BLD-MCNC: 12.3 G/DL (ref 11.5–15.4)
IMM GRANULOCYTES # BLD AUTO: 0.03 THOUSAND/UL (ref 0–0.2)
IMM GRANULOCYTES NFR BLD AUTO: 0 % (ref 0–2)
LIPASE SERPL-CCNC: 36 U/L (ref 11–82)
LYMPHOCYTES # BLD AUTO: 2.53 THOUSANDS/ΜL (ref 0.6–4.47)
LYMPHOCYTES NFR BLD AUTO: 29 % (ref 14–44)
MCH RBC QN AUTO: 29.1 PG (ref 26.8–34.3)
MCHC RBC AUTO-ENTMCNC: 31.9 G/DL (ref 31.4–37.4)
MCV RBC AUTO: 91 FL (ref 82–98)
MONOCYTES # BLD AUTO: 0.63 THOUSAND/ΜL (ref 0.17–1.22)
MONOCYTES NFR BLD AUTO: 7 % (ref 4–12)
NEUTROPHILS # BLD AUTO: 5.19 THOUSANDS/ΜL (ref 1.85–7.62)
NEUTS SEG NFR BLD AUTO: 61 % (ref 43–75)
NRBC BLD AUTO-RTO: 0 /100 WBCS
PLATELET # BLD AUTO: 298 THOUSANDS/UL (ref 149–390)
PMV BLD AUTO: 11 FL (ref 8.9–12.7)
POTASSIUM SERPL-SCNC: 3.7 MMOL/L (ref 3.5–5.3)
PROT SERPL-MCNC: 8.1 G/DL (ref 6.4–8.4)
RBC # BLD AUTO: 4.23 MILLION/UL (ref 3.81–5.12)
SODIUM SERPL-SCNC: 140 MMOL/L (ref 135–147)
WBC # BLD AUTO: 8.61 THOUSAND/UL (ref 4.31–10.16)

## 2024-09-26 PROCEDURE — 36415 COLL VENOUS BLD VENIPUNCTURE: CPT | Performed by: EMERGENCY MEDICINE

## 2024-09-26 PROCEDURE — 85025 COMPLETE CBC W/AUTO DIFF WBC: CPT | Performed by: EMERGENCY MEDICINE

## 2024-09-26 PROCEDURE — 99284 EMERGENCY DEPT VISIT MOD MDM: CPT

## 2024-09-26 PROCEDURE — 84703 CHORIONIC GONADOTROPIN ASSAY: CPT | Performed by: EMERGENCY MEDICINE

## 2024-09-26 PROCEDURE — 84484 ASSAY OF TROPONIN QUANT: CPT | Performed by: EMERGENCY MEDICINE

## 2024-09-26 PROCEDURE — 83690 ASSAY OF LIPASE: CPT | Performed by: EMERGENCY MEDICINE

## 2024-09-26 PROCEDURE — 99285 EMERGENCY DEPT VISIT HI MDM: CPT | Performed by: EMERGENCY MEDICINE

## 2024-09-26 PROCEDURE — 71045 X-RAY EXAM CHEST 1 VIEW: CPT

## 2024-09-26 PROCEDURE — 93005 ELECTROCARDIOGRAM TRACING: CPT

## 2024-09-26 PROCEDURE — 96374 THER/PROPH/DIAG INJ IV PUSH: CPT

## 2024-09-26 PROCEDURE — 80053 COMPREHEN METABOLIC PANEL: CPT | Performed by: EMERGENCY MEDICINE

## 2024-09-26 PROCEDURE — 74177 CT ABD & PELVIS W/CONTRAST: CPT

## 2024-09-26 RX ORDER — FAMOTIDINE 20 MG/1
20 TABLET, FILM COATED ORAL DAILY
Qty: 7 TABLET | Refills: 0 | Status: SHIPPED | OUTPATIENT
Start: 2024-09-26 | End: 2024-10-03

## 2024-09-26 RX ORDER — MAGNESIUM HYDROXIDE/ALUMINUM HYDROXICE/SIMETHICONE 120; 1200; 1200 MG/30ML; MG/30ML; MG/30ML
30 SUSPENSION ORAL ONCE
Status: COMPLETED | OUTPATIENT
Start: 2024-09-26 | End: 2024-09-26

## 2024-09-26 RX ORDER — ALUMINA, MAGNESIA, AND SIMETHICONE 2400; 2400; 240 MG/30ML; MG/30ML; MG/30ML
10 SUSPENSION ORAL EVERY 6 HOURS PRN
Qty: 280 ML | Refills: 0 | Status: SHIPPED | OUTPATIENT
Start: 2024-09-26 | End: 2024-10-03

## 2024-09-26 RX ORDER — SUCRALFATE 1 G/1
1 TABLET ORAL 4 TIMES DAILY
Qty: 28 TABLET | Refills: 0 | Status: SHIPPED | OUTPATIENT
Start: 2024-09-26 | End: 2024-10-03

## 2024-09-26 RX ORDER — FAMOTIDINE 10 MG/ML
20 INJECTION, SOLUTION INTRAVENOUS ONCE
Status: COMPLETED | OUTPATIENT
Start: 2024-09-26 | End: 2024-09-26

## 2024-09-26 RX ORDER — SUCRALFATE 1 G/1
1 TABLET ORAL ONCE
Status: COMPLETED | OUTPATIENT
Start: 2024-09-26 | End: 2024-09-26

## 2024-09-26 RX ADMIN — SUCRALFATE 1 G: 1 TABLET ORAL at 18:19

## 2024-09-26 RX ADMIN — IOHEXOL 100 ML: 350 INJECTION, SOLUTION INTRAVENOUS at 20:45

## 2024-09-26 RX ADMIN — FAMOTIDINE 20 MG: 10 INJECTION, SOLUTION INTRAVENOUS at 18:18

## 2024-09-26 RX ADMIN — ALUMINUM HYDROXIDE, MAGNESIUM HYDROXIDE, DIMETHICONE 30 ML: 400; 400; 40 SUSPENSION ORAL at 18:18

## 2024-09-26 NOTE — ED PROVIDER NOTES
Final diagnoses:   Epigastric abdominal pain   Hiatal hernia     ED Disposition       ED Disposition   Discharge    Condition   Stable    Date/Time   u Sep 26, 2024  9:44 PM    Comment   Gerda Tyson discharge to home/self care.                   Assessment & Plan       Medical Decision Making  54-year-old female presenting for evaluation of heartburn, epigastric abdominal pain.  Vital signs stable on arrival.  Differential diagnoses include but not limited to GERD, ACS, pneumonia, pneumothorax, pancreatitis, cholecystitis, obstruction, perforation.  Labs overall unremarkable.  EKG nonischemic.  CT abdomen pelvis showing a hiatal hernia but otherwise unremarkable.  Patient treated symptomatically with GI cocktail with significant improvement in her symptoms.  Referred to gastroenterology for follow-up.  Otherwise stable for discharge at this time.  Advised follow-up with PCP as well.  Return precautions discussed.    Problems Addressed:  Epigastric abdominal pain: acute illness or injury  Hiatal hernia: acute illness or injury    Amount and/or Complexity of Data Reviewed  Labs: ordered. Decision-making details documented in ED Course.  Radiology: ordered and independent interpretation performed.  ECG/medicine tests: ordered and independent interpretation performed. Decision-making details documented in ED Course.    Risk  OTC drugs.  Prescription drug management.        ED Course as of 09/26/24 2156   Thu Sep 26, 2024   1810 Procedure Note: EKG  Date/Time: 09/26/24 6:03 PM   Interpreted by: Radha Olivas  Indications / Diagnosis: CP  ECG reviewed by me, the ED Provider: yes   The EKG demonstrates:  Rhythm: rate 65, normal sinus  Intervals: normal intervals  Axis: normal axis  QRS/Blocks: normal QRS  ST Changes: No acute ST Changes, no STD/PAYAL.    1834 CBC and differential   1900 hs TnI 0hr: <2   1900 LIPASE: 36   1900 PREGNANCY, SERUM: Negative   1900 Comprehensive metabolic panel   2114 Patient  re-evaluated. Feeling improved after medications.       Medications   sucralfate (CARAFATE) tablet 1 g (1 g Oral Given 9/26/24 1819)   Famotidine (PF) (PEPCID) injection 20 mg (20 mg Intravenous Given 9/26/24 1818)   aluminum-magnesium hydroxide-simethicone (MAALOX) oral suspension 30 mL (30 mL Oral Given 9/26/24 1818)   iohexol (OMNIPAQUE) 350 MG/ML injection (SINGLE-DOSE) 100 mL (100 mL Intravenous Given 9/26/24 2045)       ED Risk Strat Scores   HEART Risk Score      Flowsheet Row Most Recent Value   Heart Score Risk Calculator    History 0 Filed at: 09/26/2024 2155   ECG 0 Filed at: 09/26/2024 2155   Age 1 Filed at: 09/26/2024 2155   Risk Factors 0 Filed at: 09/26/2024 2155   Troponin 0 Filed at: 09/26/2024 2155   HEART Score 1 Filed at: 09/26/2024 2155                               SBIRT 22yo+      Flowsheet Row Most Recent Value   Initial Alcohol Screen: US AUDIT-C     1. How often do you have a drink containing alcohol? 1 Filed at: 09/26/2024 1800   Audit-C Score 1 Filed at: 09/26/2024 1800   PETE: How many times in the past year have you...    Used an illegal drug or used a prescription medication for non-medical reasons? Never Filed at: 09/26/2024 1800                            History of Present Illness       Chief Complaint   Patient presents with    Heartburn     Patient reports heartburn / acid reflux since this past Sunday, has been taking Pepto Bismol w/o relief. Denies any personal history of cardiac disease but does report a family history. Reports radiation of pain to jaw. Denies shortness of breath or sweating.       Past Medical History:   Diagnosis Date    Arthritis     Back pain     Cataract     right eye    Depression     GERD (gastroesophageal reflux disease)       Past Surgical History:   Procedure Laterality Date    CARPAL TUNNEL RELEASE Right     WISDOM TOOTH EXTRACTION        Family History   Problem Relation Age of Onset    Lung cancer Maternal Uncle     Uterine cancer Maternal  Grandmother     Ovarian cancer Maternal Grandmother     Lymphoma Paternal Grandmother     Hypertension Mother     Heart disease Mother         acute    Diabetes Father     Hypertension Father     Heart attack Father     Stroke Father     Diabetes Brother     Hypertension Brother       Social History     Tobacco Use    Smoking status: Former     Current packs/day: 0.00     Types: Cigarettes     Quit date: 2014     Years since quitting: 10.7    Smokeless tobacco: Never    Tobacco comments:     E-cig   Vaping Use    Vaping status: Some Days    Substances: Nicotine, Flavoring   Substance Use Topics    Alcohol use: Not Currently    Drug use: No      E-Cigarette/Vaping    E-Cigarette Use Current Some Day User       E-Cigarette/Vaping Substances    Nicotine Yes     Flavoring Yes       I have reviewed and agree with the history as documented.     54-year-old female with history of GERD, depression presenting for evaluation of heartburn.  Patient reports onset 4 days ago.  She states that she has had what she feels is heartburn with a burning sensation in her epigastric region as well as her chest.  At times the pain radiates to her throat and jaw.  She has been belching a lot.  She has had some nausea but no vomiting.  No diaphoresis.  She denies any fevers.  She has had a slight cough.  She has chronic shortness of breath which is at baseline for her.        Review of Systems   Constitutional:  Negative for chills and fever.   Respiratory:  Positive for cough and shortness of breath (chronic).    Cardiovascular:  Positive for chest pain. Negative for leg swelling.   Gastrointestinal:  Positive for abdominal pain and nausea. Negative for diarrhea and vomiting.   Genitourinary:  Negative for dysuria, flank pain and frequency.   Musculoskeletal:  Negative for gait problem.   Skin:  Negative for rash.   Neurological:  Negative for weakness and light-headedness.   All other systems reviewed and are negative.          Objective        ED Triage Vitals [09/26/24 1758]   Temperature Pulse Blood Pressure Respirations SpO2 Patient Position - Orthostatic VS   98.2 °F (36.8 °C) 71 124/70 16 100 % Sitting      Temp Source Heart Rate Source BP Location FiO2 (%) Pain Score    Oral Monitor Right arm -- 8      Vitals      Date and Time Temp Pulse SpO2 Resp BP Pain Score FACES Pain Rating User   09/26/24 2139 -- 57 100 % 15 101/70 -- -- AF   09/26/24 1758 98.2 °F (36.8 °C) 71 100 % 16 124/70 8 -- KLB            Physical Exam  Vitals and nursing note reviewed.   Constitutional:       General: She is not in acute distress.     Appearance: She is well-developed. She is not ill-appearing.   HENT:      Head: Normocephalic and atraumatic.      Nose: Nose normal.      Mouth/Throat:      Mouth: Oropharynx is clear and moist. Mucous membranes are moist.   Eyes:      Extraocular Movements: EOM normal.      Conjunctiva/sclera: Conjunctivae normal.   Cardiovascular:      Rate and Rhythm: Normal rate and regular rhythm.      Heart sounds: No murmur heard.     No friction rub. No gallop.   Pulmonary:      Effort: Pulmonary effort is normal.      Breath sounds: Normal breath sounds. No wheezing, rhonchi or rales.   Abdominal:      General: There is no distension.      Palpations: Abdomen is soft.      Tenderness: There is abdominal tenderness in the epigastric area. There is no guarding or rebound.   Musculoskeletal:         General: No swelling, tenderness or edema. Normal range of motion.      Cervical back: Normal range of motion and neck supple.   Skin:     General: Skin is warm and dry.      Coloration: Skin is not pale.      Findings: No rash.   Neurological:      General: No focal deficit present.      Mental Status: She is alert and oriented to person, place, and time.   Psychiatric:         Mood and Affect: Mood and affect normal.         Behavior: Behavior normal.         Results Reviewed       Procedure Component Value Units Date/Time    HS Troponin 0hr  (reflex protocol) [[id number]]  (Normal) Collected: 09/26/24 1818    Lab Status: Final result Specimen: Blood from Arm, Left Updated: 09/26/24 1900     hs TnI 0hr <2 ng/L     hCG, qualitative pregnancy [[id number]]  (Normal) Collected: 09/26/24 1818    Lab Status: Final result Specimen: Blood from Arm, Left Updated: 09/26/24 1859     Preg, Serum Negative    Comprehensive metabolic panel [[id number]] Collected: 09/26/24 1818    Lab Status: Final result Specimen: Blood from Arm, Left Updated: 09/26/24 1856     Sodium 140 mmol/L      Potassium 3.7 mmol/L      Chloride 101 mmol/L      CO2 30 mmol/L      ANION GAP 9 mmol/L      BUN 11 mg/dL      Creatinine 0.99 mg/dL      Glucose 90 mg/dL      Calcium 9.5 mg/dL      AST 13 U/L      ALT 10 U/L      Alkaline Phosphatase 63 U/L      Total Protein 8.1 g/dL      Albumin 4.4 g/dL      Total Bilirubin 0.39 mg/dL      eGFR 65 ml/min/1.73sq m     Narrative:      [employer] Kid[employer] guidelines for Chronic Kidney Disease (CKD):     Stage 1 with normal or high GFR (GFR > 90 mL/min/1.73 square meters)    Stage 2 Mild CKD (GFR = 60-89 mL/min/1.73 square meters)    Stage 3A Moderate CKD (GFR = 45-59 mL/min/1.73 square meters)    Stage 3B Moderate CKD (GFR = 30-44 mL/min/1.73 square meters)    Stage 4 Severe CKD (GFR = 15-29 mL/min/1.73 square meters)    Stage 5 End Stage CKD (GFR <15 mL/min/1.73 square meters)  Note: GFR calculation is accurate only with a steady state creatinine    Lipase [[id number]]  (Normal) Collected: 09/26/24 1818    Lab Status: Final result Specimen: Blood from Arm, Left Updated: 09/26/24 1856     Lipase 36 u/L     CBC and differential [[id number]] Collected: 09/26/24 1818    Lab Status: Final result Specimen: Blood from Arm, Left Updated: 09/26/24 1833     WBC 8.61 Thousand/uL      RBC 4.23 Million/uL      Hemoglobin 12.3 g/dL      Hematocrit 38.5 %      MCV 91 fL      MCH 29.1 pg      MCHC 31.9 g/dL      RDW 13.2 %      MPV 11.0 fL      Platelets 298  Thousands/uL      nRBC 0 /100 WBCs      Segmented % 61 %      Immature Grans % 0 %      Lymphocytes % 29 %      Monocytes % 7 %      Eosinophils Relative 2 %      Basophils Relative 1 %      Absolute Neutrophils 5.19 Thousands/µL      Absolute Immature Grans 0.03 Thousand/uL      Absolute Lymphocytes 2.53 Thousands/µL      Absolute Monocytes 0.63 Thousand/µL      Eosinophils Absolute 0.16 Thousand/µL      Basophils Absolute 0.07 Thousands/µL             CT abdomen pelvis with contrast   Final Interpretation by Amie Mdarigal MD (09/26 2138)      No acute findings in the abdomen or pelvis.      Small hiatal hernia.      Nonobstructing right renal calculus.         Workstation performed: [id number]         XR chest 1 view portable   ED Interpretation by Radha Olivas MD (09/26 1820)   No infiltrate or pneumothorax.  Independently interpreted by me.          Procedures    ED Medication and Procedure Management   Prior to Admission Medications   Prescriptions Last Dose Informant Patient Reported? Taking?   estradiol (ESTRACE) 0.1 mg/g vaginal cream  Self No No   Sig: USE A PEA SIZE AMOUNT 2-3 TIMES A WEEK ON THE VULVAR REGION.   Patient not taking: Reported on 4/23/2024   gabapentin (Neurontin) 100 mg capsule   No No   Sig: Take 1 capsule (100 mg total) by mouth daily at bedtime   Patient not taking: Reported on 4/23/2024   naproxen (Naprosyn) 500 mg tablet  Self No No   Sig: Take 1 tablet (500 mg total) by mouth 2 (two) times a day with meals   Patient not taking: Reported on 4/23/2024   sertraline (Zoloft) 50 mg tablet   No No   Sig: Take 1 tablet (50 mg total) by mouth daily 1/2 tablet on e daily x 7 days, then one tablet daily therafter      Facility-Administered Medications: None     Discharge Medication List as of 9/26/2024  9:45 PM        START taking these medications    Details   aluminum-magnesium hydroxide-simethicone (MAALOX MAX) 400-400-40 MG/5ML suspension Take 10 mL by mouth every 6 (six) hours as  needed for heartburn for up to 7 days, Starting Thu 9/26/2024, Until Thu 10/3/2024 at 2359, Normal      famotidine (PEPCID) 20 mg tablet Take 1 tablet (20 mg total) by mouth daily for 7 days, Starting u 9/26/2024, Until Thu 10/3/2024, Normal      sucralfate (CARAFATE) 1 g tablet Take 1 tablet (1 g total) by mouth 4 (four) times a day for 7 days, Starting Thu 9/26/2024, Until u 10/3/2024, Normal           CONTINUE these medications which have NOT CHANGED    Details   estradiol (ESTRACE) 0.1 mg/g vaginal cream USE A PEA SIZE AMOUNT 2-3 TIMES A WEEK ON THE VULVAR REGION., Normal      gabapentin (Neurontin) 100 mg capsule Take 1 capsule (100 mg total) by mouth daily at bedtime, Starting Fri 2/3/2023, Until Sun 3/5/2023, Normal      naproxen (Naprosyn) 500 mg tablet Take 1 tablet (500 mg total) by mouth 2 (two) times a day with meals, Starting Fri 8/18/2023, Normal      sertraline (Zoloft) 50 mg tablet Take 1 tablet (50 mg total) by mouth daily 1/2 tablet on e daily x 7 days, then one tablet daily therafter, Starting Wed 7/24/2024, Normal             ED SEPSIS DOCUMENTATION   Time reflects when diagnosis was documented in both MDM as applicable and the Disposition within this note       Time User Action Codes Description Comment    9/26/2024  9:44 PM Radha Olivas [R10.13] Epigastric abdominal pain     9/26/2024  9:44 PM Radha Olivas [K44.9] Hiatal hernia                  Radha Olivas MD  09/26/24 5206

## 2024-09-27 LAB
ATRIAL RATE: 65 BPM
P AXIS: 63 DEGREES
PR INTERVAL: 160 MS
QRS AXIS: 3 DEGREES
QRSD INTERVAL: 68 MS
QT INTERVAL: 392 MS
QTC INTERVAL: 407 MS
T WAVE AXIS: 43 DEGREES
VENTRICULAR RATE: 65 BPM

## 2024-09-27 PROCEDURE — 93010 ELECTROCARDIOGRAM REPORT: CPT | Performed by: INTERNAL MEDICINE

## 2024-09-27 NOTE — DISCHARGE INSTRUCTIONS
Follow-up with your primary care physician and gastroenterology.  Take the prescribed medications as directed.  Please return to the emergency department if you develop worsening symptoms, severe pain, uncontrolled vomiting, or anything else concerning to you.

## 2025-04-05 ENCOUNTER — APPOINTMENT (OUTPATIENT)
Dept: LAB | Facility: HOSPITAL | Age: 55
End: 2025-04-05
Payer: COMMERCIAL

## 2025-04-05 DIAGNOSIS — K21.9 GASTROESOPHAGEAL REFLUX DISEASE WITHOUT ESOPHAGITIS: ICD-10-CM

## 2025-04-05 DIAGNOSIS — R10.13 DYSPEPSIA: ICD-10-CM

## 2025-04-05 DIAGNOSIS — R63.4 LOSS OF WEIGHT: ICD-10-CM

## 2025-04-05 LAB
ALBUMIN SERPL BCG-MCNC: 4.6 G/DL (ref 3.5–5)
ALP SERPL-CCNC: 64 U/L (ref 34–104)
ALT SERPL W P-5'-P-CCNC: 15 U/L (ref 7–52)
ANION GAP SERPL CALCULATED.3IONS-SCNC: 6 MMOL/L (ref 4–13)
AST SERPL W P-5'-P-CCNC: 18 U/L (ref 13–39)
BILIRUB SERPL-MCNC: 0.56 MG/DL (ref 0.2–1)
BUN SERPL-MCNC: 12 MG/DL (ref 5–25)
CALCIUM SERPL-MCNC: 9.4 MG/DL (ref 8.4–10.2)
CHLORIDE SERPL-SCNC: 98 MMOL/L (ref 96–108)
CO2 SERPL-SCNC: 32 MMOL/L (ref 21–32)
CREAT SERPL-MCNC: 0.89 MG/DL (ref 0.6–1.3)
ERYTHROCYTE [DISTWIDTH] IN BLOOD BY AUTOMATED COUNT: 13.5 % (ref 11.6–15.1)
GFR SERPL CREATININE-BSD FRML MDRD: 74 ML/MIN/1.73SQ M
GLUCOSE P FAST SERPL-MCNC: 101 MG/DL (ref 65–99)
HCT VFR BLD AUTO: 37.9 % (ref 34.8–46.1)
HGB BLD-MCNC: 11.8 G/DL (ref 11.5–15.4)
MCH RBC QN AUTO: 28 PG (ref 26.8–34.3)
MCHC RBC AUTO-ENTMCNC: 31.1 G/DL (ref 31.4–37.4)
MCV RBC AUTO: 90 FL (ref 82–98)
PLATELET # BLD AUTO: 281 THOUSANDS/UL (ref 149–390)
PMV BLD AUTO: 11.2 FL (ref 8.9–12.7)
POTASSIUM SERPL-SCNC: 4.6 MMOL/L (ref 3.5–5.3)
PROT SERPL-MCNC: 7.6 G/DL (ref 6.4–8.4)
RBC # BLD AUTO: 4.21 MILLION/UL (ref 3.81–5.12)
SODIUM SERPL-SCNC: 136 MMOL/L (ref 135–147)
TSH SERPL DL<=0.05 MIU/L-ACNC: 1.04 UIU/ML (ref 0.45–4.5)
WBC # BLD AUTO: 5.91 THOUSAND/UL (ref 4.31–10.16)

## 2025-04-05 PROCEDURE — 85027 COMPLETE CBC AUTOMATED: CPT

## 2025-04-05 PROCEDURE — 80053 COMPREHEN METABOLIC PANEL: CPT

## 2025-04-05 PROCEDURE — 84443 ASSAY THYROID STIM HORMONE: CPT

## 2025-04-05 PROCEDURE — 36415 COLL VENOUS BLD VENIPUNCTURE: CPT

## 2025-04-19 DIAGNOSIS — N95.1 MENOPAUSAL VAGINAL DRYNESS: ICD-10-CM

## 2025-04-21 ENCOUNTER — TELEPHONE (OUTPATIENT)
Dept: OBGYN CLINIC | Facility: CLINIC | Age: 55
End: 2025-04-21

## 2025-04-21 NOTE — TELEPHONE ENCOUNTER
Lmom for pt to call back to schedule annual exam so we can send medication refill to provider. Please assist pt to schedule APE if they return call.

## 2025-04-29 RX ORDER — ESTRADIOL 0.1 MG/G
CREAM VAGINAL
Qty: 42.5 G | Refills: 0 | Status: SHIPPED | OUTPATIENT
Start: 2025-04-29